# Patient Record
Sex: MALE | Race: WHITE | Employment: FULL TIME | URBAN - METROPOLITAN AREA
[De-identification: names, ages, dates, MRNs, and addresses within clinical notes are randomized per-mention and may not be internally consistent; named-entity substitution may affect disease eponyms.]

---

## 2018-12-24 ENCOUNTER — HOSPITAL ENCOUNTER (OUTPATIENT)
Age: 68
Setting detail: OUTPATIENT SURGERY
Discharge: HOME | End: 2018-12-24
Attending: INTERNAL MEDICINE | Admitting: INTERNAL MEDICINE
Payer: COMMERCIAL

## 2018-12-24 ENCOUNTER — ANESTHESIA (OUTPATIENT)
Dept: ENDOSCOPY | Age: 68
End: 2018-12-24
Payer: COMMERCIAL

## 2018-12-24 ENCOUNTER — ANESTHESIA EVENT (OUTPATIENT)
Dept: ENDOSCOPY | Age: 68
End: 2018-12-24
Payer: COMMERCIAL

## 2018-12-24 VITALS
OXYGEN SATURATION: 98 % | DIASTOLIC BLOOD PRESSURE: 71 MMHG | SYSTOLIC BLOOD PRESSURE: 113 MMHG | WEIGHT: 215 LBS | RESPIRATION RATE: 20 BRPM | HEART RATE: 60 BPM | BODY MASS INDEX: 32.58 KG/M2 | TEMPERATURE: 98 F | HEIGHT: 68 IN

## 2018-12-24 PROCEDURE — 74011250636 HC RX REV CODE- 250/636

## 2018-12-24 PROCEDURE — 77030003657 HC NDL SCLER BSC -B: Performed by: INTERNAL MEDICINE

## 2018-12-24 PROCEDURE — 74011250636 HC RX REV CODE- 250/636: Performed by: INTERNAL MEDICINE

## 2018-12-24 PROCEDURE — 76040000019: Performed by: INTERNAL MEDICINE

## 2018-12-24 PROCEDURE — 77030013992 HC SNR POLYP ENDOSC BSC -B: Performed by: INTERNAL MEDICINE

## 2018-12-24 PROCEDURE — 76060000031 HC ANESTHESIA FIRST 0.5 HR: Performed by: INTERNAL MEDICINE

## 2018-12-24 PROCEDURE — 88305 TISSUE EXAM BY PATHOLOGIST: CPT

## 2018-12-24 RX ORDER — ATROPINE SULFATE 0.1 MG/ML
0.5 INJECTION INTRAVENOUS
Status: DISCONTINUED | OUTPATIENT
Start: 2018-12-24 | End: 2018-12-24 | Stop reason: HOSPADM

## 2018-12-24 RX ORDER — GUAIFENESIN 100 MG/5ML
81 LIQUID (ML) ORAL DAILY
COMMUNITY

## 2018-12-24 RX ORDER — MIDAZOLAM HYDROCHLORIDE 1 MG/ML
.25-5 INJECTION, SOLUTION INTRAMUSCULAR; INTRAVENOUS
Status: DISCONTINUED | OUTPATIENT
Start: 2018-12-24 | End: 2018-12-24 | Stop reason: HOSPADM

## 2018-12-24 RX ORDER — PROPOFOL 10 MG/ML
INJECTION, EMULSION INTRAVENOUS AS NEEDED
Status: DISCONTINUED | OUTPATIENT
Start: 2018-12-24 | End: 2018-12-24 | Stop reason: HOSPADM

## 2018-12-24 RX ORDER — EPINEPHRINE 0.1 MG/ML
1 INJECTION INTRACARDIAC; INTRAVENOUS
Status: DISCONTINUED | OUTPATIENT
Start: 2018-12-24 | End: 2018-12-24 | Stop reason: HOSPADM

## 2018-12-24 RX ORDER — LIDOCAINE HYDROCHLORIDE 20 MG/ML
INJECTION, SOLUTION EPIDURAL; INFILTRATION; INTRACAUDAL; PERINEURAL AS NEEDED
Status: DISCONTINUED | OUTPATIENT
Start: 2018-12-24 | End: 2018-12-24 | Stop reason: HOSPADM

## 2018-12-24 RX ORDER — NALOXONE HYDROCHLORIDE 0.4 MG/ML
0.4 INJECTION, SOLUTION INTRAMUSCULAR; INTRAVENOUS; SUBCUTANEOUS
Status: DISCONTINUED | OUTPATIENT
Start: 2018-12-24 | End: 2018-12-24 | Stop reason: HOSPADM

## 2018-12-24 RX ORDER — FLUMAZENIL 0.1 MG/ML
0.2 INJECTION INTRAVENOUS
Status: DISCONTINUED | OUTPATIENT
Start: 2018-12-24 | End: 2018-12-24 | Stop reason: HOSPADM

## 2018-12-24 RX ORDER — PROPOFOL 10 MG/ML
INJECTION, EMULSION INTRAVENOUS
Status: DISCONTINUED | OUTPATIENT
Start: 2018-12-24 | End: 2018-12-24 | Stop reason: HOSPADM

## 2018-12-24 RX ORDER — SODIUM CHLORIDE 9 MG/ML
50 INJECTION, SOLUTION INTRAVENOUS CONTINUOUS
Status: DISCONTINUED | OUTPATIENT
Start: 2018-12-24 | End: 2018-12-24 | Stop reason: HOSPADM

## 2018-12-24 RX ORDER — DEXTROMETHORPHAN/PSEUDOEPHED 2.5-7.5/.8
1.2 DROPS ORAL
Status: DISCONTINUED | OUTPATIENT
Start: 2018-12-24 | End: 2018-12-24 | Stop reason: HOSPADM

## 2018-12-24 RX ORDER — FENTANYL CITRATE 50 UG/ML
100 INJECTION, SOLUTION INTRAMUSCULAR; INTRAVENOUS
Status: DISCONTINUED | OUTPATIENT
Start: 2018-12-24 | End: 2018-12-24 | Stop reason: HOSPADM

## 2018-12-24 RX ADMIN — LIDOCAINE HYDROCHLORIDE 60 MG: 20 INJECTION, SOLUTION EPIDURAL; INFILTRATION; INTRACAUDAL; PERINEURAL at 08:10

## 2018-12-24 RX ADMIN — PROPOFOL 75 MCG/KG/MIN: 10 INJECTION, EMULSION INTRAVENOUS at 08:10

## 2018-12-24 RX ADMIN — PROPOFOL 80 MG: 10 INJECTION, EMULSION INTRAVENOUS at 08:10

## 2018-12-24 RX ADMIN — SODIUM CHLORIDE 50 ML/HR: 900 INJECTION, SOLUTION INTRAVENOUS at 07:03

## 2018-12-24 NOTE — ANESTHESIA POSTPROCEDURE EVALUATION
Procedure(s):  COLONOSCOPY  ENDOSCOPIC POLYPECTOMY  INJECTION.     Anesthesia Post Evaluation      Multimodal analgesia: multimodal analgesia not used between 6 hours prior to anesthesia start to PACU discharge  Patient location during evaluation: PACU  Patient participation: complete - patient participated  Level of consciousness: awake  Pain management: adequate  Airway patency: patent  Anesthetic complications: no  Cardiovascular status: acceptable, blood pressure returned to baseline and hemodynamically stable  Respiratory status: acceptable  Hydration status: acceptable        Visit Vitals  /71   Pulse 60   Temp 36.7 °C (98 °F)   Resp 20   Ht 5' 8\" (1.727 m)   Wt 97.5 kg (215 lb)   SpO2 98%   BMI 32.69 kg/m²

## 2018-12-24 NOTE — ROUTINE PROCESS
Jackie Johnsons  1950  [id number]    Situation:  Verbal report received from: Marilyn  Procedure: Procedure(s):  COLONOSCOPY  ENDOSCOPIC POLYPECTOMY  INJECTION    Background:    Preoperative diagnosis: FAMILY HISTORY OF MALIGNANT NEOPLASM OF GI TRACT  Postoperative diagnosis: Colon Polyps x 2      :  Dr. Hastings Has  Assistant(s): Endoscopy Technician-1: Adrianna Torres  Endoscopy RN-1: Shanna Vasquez RN    Specimens:   ID Type Source Tests Collected by Time Destination   1 : Colon Polyps x 2 Preservative   Mauro Hollis MD 12/24/2018 5611 Pathology     H. Pylori  no    Assessment:  Intra-procedure medications   Intravenous fluids: NS@ KVO     Vital signs stable     Abdominal assessment: round and soft     Recommendation:  Discharge patient per MD order.   Family or Friend   Permission to share finding with family or friend yes

## 2018-12-24 NOTE — H&P
Gastroenterology Outpatient History and Physical    Patient: Stephen Yañezchilla    Physician: Maria Fernanda Aguilar MD    Vital Signs: See RN notes    Allergies: Allergies not on file    Chief Complaint: colon cancer screen    History of Present Illness: No symptoms; no chest pain, SOB, edema, focal weakness, numbness    Justification for Procedure: age    History:  No past medical history on file. No past surgical history on file. No family history on file. Medications:   Prior to Admission medications    Not on File       Physical Exam:   General: alert, cooperative, no distress   HEENT: Head: Normal, normocephalic, atraumatic. Heart: regular rate and rhythm   Lungs: chest clear, no wheezing, rales, normal symmetric air entry   Abdominal: Bowel sounds are normal, liver is not enlarged, spleen is not enlarged           Findings/Diagnosis: Colon cancer screening    Plan of Care/Planned Procedure: I've discussed colonoscopy possible biopsy, polypectomy, cautery, injection, alternatives, complications including but not limited to pain, cardiopulmonary event, bleeding, perforation requiring additional blood transfusion or operative repair; all questions answered.     Signed By: Maria Fernanda Aguilar MD     December 24, 2018

## 2018-12-24 NOTE — DISCHARGE INSTRUCTIONS
Calvin Jiménez  [id number]  1950    DISCHARGE INSTRUCTIONS  Discomfort:  Redness at IV site- apply warm compress to area; if redness or soreness persist- contact your physician. There may be a slight amount of blood passed from the rectum. Gaseous discomfort - walking, belching will help relieve any discomfort. You may not operate a vehicle for 12 hours. You may not engage in an occupation involving machinery or appliances for rest of today. You may not drink alcoholic beverages for at least 12 hours. Avoid making any critical decisions for at least 24 hours. DIET:   High fiber diet. - however -  remember your colon is empty and a heavy meal will produce gas. Avoid these foods:  vegetables, fried / greasy foods, carbonated drinks for today. ACTIVITY:  You may resume your normal daily activities it is recommended that you spend the remainder of the day resting -  avoid any strenuous activity. CALL M.D.   ANY SIGN OF:   Increasing pain, nausea, vomiting  Abdominal distension (swelling)  New increased bleeding (oral or rectal)  Fever (chills)  Pain in chest area  Bloody discharge from nose or mouth  Shortness of breath     Follow-up Instructions:  Call Dr. Margo Elizabeth in five years follow up exam      DISCHARGE SUMMARY from Nurse    The following personal items collected during your admission are returned to you:   Dental Appliance: Dental Appliances: None  Vision: Visual Aid: Glasses  Hearing Aid:    Jewelry:    Clothing:    Other Valuables:    Valuables sent to safe:

## 2018-12-24 NOTE — PROCEDURES
[address]  [phone]      2018    Colonoscopy Procedure Note  Ankita Louis  :  1950  Marco A Medical Record Number: [id number]    Indications:     Screening colonoscopy  PCP:  Dale Soriano MD  Anesthesia/Sedation: Conscious Sedation/Moderate Sedation  Endoscopist:  Dr. Merline Bald; no surgical assistants  Complications:  None  Estimate Blood Loss:  None    Permit:  The indications, risks, benefits and alternatives were reviewed with the patient or their decision maker who was provided an opportunity to ask questions and all questions were answered. The specific risks of colonoscopy with conscious sedation were reviewed, including but not limited to anesthetic complication, bleeding, adverse drug reaction, missed lesion, infection, IV site reactions, and intestinal perforation which would lead to the need for surgical repair. Alternatives to colonoscopy including radiographic imaging, observation without testing, or laboratory testing were reviewed including the limitations of those alternatives. After considering the options and having all their questions answered, the patient or their decision maker provided both verbal and written consent to proceed. Procedure in Detail:  After obtaining informed consent, positioning of the patient in the left lateral decubitus position, and conduction of a pre-procedure pause or \"time out\" the endoscope was introduced into the anus and advanced to the cecum, which was identified by the ileocecal valve and appendiceal orifice. The quality of the colonic preparation was satisfactory. A careful inspection was made as the colonoscope was withdrawn, findings and interventions are described below.     Appendiceal orifice photographed    Findings:   12 mm sessile ascending polyp  27 mm sessile rectal polyp    Specimens:    ascending polyp removed cold snare; rectal polyp removed cautery snare over saline pillow    Complications:   None; patient tolerated the procedure well. Estimated blood loss: none    Impression:  colon and rectal polyp    Recommendations:     - Repeat colonoscopy in 5 years. Thank you for entrusting me with this patient's care. Please do not hesitate to contact me with any questions or if I can be of assistance with any of your other patients' GI needs.     Signed By: Laura Gallardo MD                        December 24, 2018

## 2018-12-24 NOTE — ANESTHESIA PREPROCEDURE EVALUATION
Anesthetic History   No history of anesthetic complications            Review of Systems / Medical History  Patient summary reviewed and pertinent labs reviewed    Pulmonary  Within defined limits                 Neuro/Psych   Within defined limits           Cardiovascular  Within defined limits                Exercise tolerance: >4 METS     GI/Hepatic/Renal         Renal disease: stones       Endo/Other        Arthritis     Other Findings   Comments: Factor V leiden           Physical Exam    Airway  Mallampati: II  TM Distance: 4 - 6 cm  Neck ROM: normal range of motion   Mouth opening: Normal     Cardiovascular  Regular rate and rhythm,  S1 and S2 normal,  no murmur, click, rub, or gallop  Rhythm: regular  Rate: normal         Dental  No notable dental hx       Pulmonary  Breath sounds clear to auscultation               Abdominal  GI exam deferred       Other Findings            Anesthetic Plan    ASA: 2  Anesthesia type: MAC          Induction: Intravenous  Anesthetic plan and risks discussed with: Patient

## 2018-12-24 NOTE — PERIOP NOTES
Report from Suleman Napoles, [facility], see anesthesia record. ABD remains soft and non-tender post procedure. Pt has no complaints at this time and tolerated the procedure well. Endoscope was pre-cleaned at bedside immediately following procedure by Alessia Glasgow.

## 2019-07-25 ENCOUNTER — HOSPITAL ENCOUNTER (OUTPATIENT)
Dept: CT IMAGING | Age: 69
Discharge: HOME | End: 2019-07-25
Attending: INTERNAL MEDICINE
Payer: COMMERCIAL

## 2019-07-25 DIAGNOSIS — C20 PRIMARY MALIGNANT NEOPLASM OF RECTUM (HCC): ICD-10-CM

## 2019-07-25 LAB — CREAT BLD-MCNC: 0.9 MG/DL (ref 0.6–1.3)

## 2019-07-25 PROCEDURE — 74177 CT ABD & PELVIS W/CONTRAST: CPT

## 2019-07-25 PROCEDURE — 74011636320 HC RX REV CODE- 636/320: Performed by: RADIOLOGY

## 2019-07-25 PROCEDURE — 82565 ASSAY OF CREATININE: CPT

## 2019-07-25 RX ADMIN — IOPAMIDOL 100 ML: 755 INJECTION, SOLUTION INTRAVENOUS at 19:07

## 2020-03-26 ENCOUNTER — HOSPITAL ENCOUNTER (OUTPATIENT)
Age: 70
Setting detail: OBSERVATION
Discharge: HOME | End: 2020-03-27
Attending: EMERGENCY MEDICINE | Admitting: INTERNAL MEDICINE
Payer: COMMERCIAL

## 2020-03-26 ENCOUNTER — APPOINTMENT (OUTPATIENT)
Dept: CT IMAGING | Age: 70
End: 2020-03-26
Attending: EMERGENCY MEDICINE
Payer: COMMERCIAL

## 2020-03-26 DIAGNOSIS — R42 DIZZINESS: ICD-10-CM

## 2020-03-26 DIAGNOSIS — G45.9 TIA (TRANSIENT ISCHEMIC ATTACK): Primary | ICD-10-CM

## 2020-03-26 LAB
BASOPHILS # BLD: 0 K/UL (ref 0–0.1)
BASOPHILS NFR BLD: 1 % (ref 0–1)
DIFFERENTIAL METHOD BLD: ABNORMAL
EOSINOPHIL # BLD: 0.4 K/UL (ref 0–0.4)
EOSINOPHIL NFR BLD: 6 % (ref 0–7)
ERYTHROCYTE [DISTWIDTH] IN BLOOD BY AUTOMATED COUNT: 12.8 % (ref 11.5–14.5)
GLUCOSE BLD STRIP.AUTO-MCNC: 120 MG/DL (ref 65–100)
HCT VFR BLD AUTO: 45.3 % (ref 36.6–50.3)
HGB BLD-MCNC: 15.4 G/DL (ref 12.1–17)
IMM GRANULOCYTES # BLD AUTO: 0 K/UL (ref 0–0.04)
IMM GRANULOCYTES NFR BLD AUTO: 1 % (ref 0–0.5)
INR PPP: 1 (ref 0.9–1.1)
LYMPHOCYTES # BLD: 1.9 K/UL (ref 0.8–3.5)
LYMPHOCYTES NFR BLD: 30 % (ref 12–49)
MCH RBC QN AUTO: 30.7 PG (ref 26–34)
MCHC RBC AUTO-ENTMCNC: 34 G/DL (ref 30–36.5)
MCV RBC AUTO: 90.2 FL (ref 80–99)
MONOCYTES # BLD: 0.7 K/UL (ref 0–1)
MONOCYTES NFR BLD: 11 % (ref 5–13)
NEUTS SEG # BLD: 3.4 K/UL (ref 1.8–8)
NEUTS SEG NFR BLD: 51 % (ref 32–75)
NRBC # BLD: 0 K/UL (ref 0–0.01)
NRBC BLD-RTO: 0 PER 100 WBC
PLATELET # BLD AUTO: 161 K/UL (ref 150–400)
PMV BLD AUTO: 10.7 FL (ref 8.9–12.9)
PROTHROMBIN TIME: 10.4 SEC (ref 9–11.1)
RBC # BLD AUTO: 5.02 M/UL (ref 4.1–5.7)
SERVICE CMNT-IMP: ABNORMAL
WBC # BLD AUTO: 6.4 K/UL (ref 4.1–11.1)

## 2020-03-26 PROCEDURE — 82962 GLUCOSE BLOOD TEST: CPT

## 2020-03-26 PROCEDURE — 70496 CT ANGIOGRAPHY HEAD: CPT

## 2020-03-26 PROCEDURE — 80053 COMPREHEN METABOLIC PANEL: CPT

## 2020-03-26 PROCEDURE — 99285 EMERGENCY DEPT VISIT HI MDM: CPT

## 2020-03-26 PROCEDURE — 36415 COLL VENOUS BLD VENIPUNCTURE: CPT

## 2020-03-26 PROCEDURE — 74011250637 HC RX REV CODE- 250/637: Performed by: EMERGENCY MEDICINE

## 2020-03-26 PROCEDURE — 85025 COMPLETE CBC W/AUTO DIFF WBC: CPT

## 2020-03-26 PROCEDURE — 84484 ASSAY OF TROPONIN QUANT: CPT

## 2020-03-26 PROCEDURE — 70450 CT HEAD/BRAIN W/O DYE: CPT

## 2020-03-26 PROCEDURE — 85610 PROTHROMBIN TIME: CPT

## 2020-03-26 RX ORDER — ASPIRIN 325 MG
325 TABLET ORAL
Status: COMPLETED | OUTPATIENT
Start: 2020-03-26 | End: 2020-03-26

## 2020-03-26 RX ORDER — SODIUM CHLORIDE 9 MG/ML
50 INJECTION, SOLUTION INTRAVENOUS ONCE
Status: DISCONTINUED | OUTPATIENT
Start: 2020-03-26 | End: 2020-03-26

## 2020-03-26 RX ADMIN — ASPIRIN 325 MG: 325 TABLET ORAL at 23:47

## 2020-03-27 ENCOUNTER — APPOINTMENT (OUTPATIENT)
Dept: NON INVASIVE DIAGNOSTICS | Age: 70
End: 2020-03-27
Attending: INTERNAL MEDICINE
Payer: COMMERCIAL

## 2020-03-27 ENCOUNTER — APPOINTMENT (OUTPATIENT)
Dept: MRI IMAGING | Age: 70
End: 2020-03-27
Attending: INTERNAL MEDICINE
Payer: COMMERCIAL

## 2020-03-27 VITALS
WEIGHT: 216 LBS | DIASTOLIC BLOOD PRESSURE: 88 MMHG | OXYGEN SATURATION: 97 % | BODY MASS INDEX: 32.74 KG/M2 | HEIGHT: 68 IN | HEART RATE: 73 BPM | TEMPERATURE: 98.2 F | SYSTOLIC BLOOD PRESSURE: 150 MMHG | RESPIRATION RATE: 16 BRPM

## 2020-03-27 PROBLEM — G45.9 TIA (TRANSIENT ISCHEMIC ATTACK): Status: ACTIVE | Noted: 2020-03-27

## 2020-03-27 LAB
ALBUMIN SERPL-MCNC: 3.6 G/DL (ref 3.5–5)
ALBUMIN/GLOB SERPL: 0.8 {RATIO} (ref 1.1–2.2)
ALP SERPL-CCNC: 77 U/L (ref 45–117)
ALT SERPL-CCNC: 27 U/L (ref 12–78)
ANION GAP SERPL CALC-SCNC: 6 MMOL/L (ref 5–15)
AST SERPL-CCNC: 26 U/L (ref 15–37)
ATRIAL RATE: 84 BPM
AV VELOCITY RATIO: 0.76
BILIRUB SERPL-MCNC: 0.3 MG/DL (ref 0.2–1)
BUN SERPL-MCNC: 12 MG/DL (ref 6–20)
BUN/CREAT SERPL: 12 (ref 12–20)
CALCIUM SERPL-MCNC: 8.6 MG/DL (ref 8.5–10.1)
CALCULATED P AXIS, ECG09: 14 DEGREES
CALCULATED R AXIS, ECG10: -17 DEGREES
CALCULATED T AXIS, ECG11: 26 DEGREES
CHLORIDE SERPL-SCNC: 105 MMOL/L (ref 97–108)
CHOLEST SERPL-MCNC: 188 MG/DL
CO2 SERPL-SCNC: 27 MMOL/L (ref 21–32)
CREAT SERPL-MCNC: 1.02 MG/DL (ref 0.7–1.3)
DIAGNOSIS, 93000: NORMAL
ECHO AO ROOT DIAM: 3.66 CM
ECHO AV AREA PEAK VELOCITY: 2.3 CM2
ECHO AV PEAK GRADIENT: 6 MMHG
ECHO AV PEAK VELOCITY: 122.11 CM/S
ECHO LA MAJOR AXIS: 2.99 CM
ECHO LA TO AORTIC ROOT RATIO: 0.82
ECHO LA VOL 2C: 54.25 ML (ref 18–58)
ECHO LA VOL 4C: 49.69 ML (ref 18–58)
ECHO LA VOL BP: 56.23 ML (ref 18–58)
ECHO LA VOL/BSA BIPLANE: 26.63 ML/M2 (ref 16–28)
ECHO LA VOLUME INDEX A2C: 25.69 ML/M2 (ref 16–28)
ECHO LA VOLUME INDEX A4C: 23.53 ML/M2 (ref 16–28)
ECHO LV INTERNAL DIMENSION DIASTOLIC: 4.37 CM (ref 4.2–5.9)
ECHO LV INTERNAL DIMENSION SYSTOLIC: 2.88 CM
ECHO LV IVSD: 0.9 CM (ref 0.6–1)
ECHO LV MASS 2D: 136.1 G (ref 88–224)
ECHO LV MASS INDEX 2D: 64.4 G/M2 (ref 49–115)
ECHO LV POSTERIOR WALL DIASTOLIC: 0.83 CM (ref 0.6–1)
ECHO LVOT DIAM: 1.96 CM
ECHO LVOT PEAK GRADIENT: 3.4 MMHG
ECHO LVOT PEAK VELOCITY: 92.86 CM/S
ECHO MV A VELOCITY: 87.11 CM/S
ECHO MV E DECELERATION TIME (DT): 225.4 MS
ECHO MV E VELOCITY: 67.6 CM/S
ECHO MV E/A RATIO: 0.78
ECHO MV REGURGITANT PEAK GRADIENT: 40.5 MMHG
ECHO MV REGURGITANT PEAK VELOCITY: 318.12 CM/S
ECHO PULMONARY ARTERY SYSTOLIC PRESSURE (PASP): 27 MMHG
ECHO PV MAX VELOCITY: 63.92 CM/S
ECHO PV PEAK GRADIENT: 1.6 MMHG
ECHO RV INTERNAL DIMENSION: 3.26 CM
ECHO TV REGURGITANT MAX VELOCITY: 244.35 CM/S
ECHO TV REGURGITANT PEAK GRADIENT: 23.9 MMHG
ERYTHROCYTE [DISTWIDTH] IN BLOOD BY AUTOMATED COUNT: 12.9 % (ref 11.5–14.5)
GLOBULIN SER CALC-MCNC: 4.3 G/DL (ref 2–4)
GLUCOSE SERPL-MCNC: 111 MG/DL (ref 65–100)
HCT VFR BLD AUTO: 44.5 % (ref 36.6–50.3)
HDLC SERPL-MCNC: 34 MG/DL
HDLC SERPL: 5.5 {RATIO} (ref 0–5)
HGB BLD-MCNC: 15 G/DL (ref 12.1–17)
LDLC SERPL CALC-MCNC: 129.4 MG/DL (ref 0–100)
LIPID PROFILE,FLP: ABNORMAL
LVFS 2D: 34.17 %
MCH RBC QN AUTO: 30.2 PG (ref 26–34)
MCHC RBC AUTO-ENTMCNC: 33.7 G/DL (ref 30–36.5)
MCV RBC AUTO: 89.5 FL (ref 80–99)
MV DEC SLOPE: 3
NRBC # BLD: 0 K/UL (ref 0–0.01)
NRBC BLD-RTO: 0 PER 100 WBC
P-R INTERVAL, ECG05: 176 MS
PLATELET # BLD AUTO: 162 K/UL (ref 150–400)
PMV BLD AUTO: 10.6 FL (ref 8.9–12.9)
POTASSIUM SERPL-SCNC: 3.6 MMOL/L (ref 3.5–5.1)
PROT SERPL-MCNC: 7.9 G/DL (ref 6.4–8.2)
PV END DIASTOLIC VELOCITY: 1 MMHG
Q-T INTERVAL, ECG07: 370 MS
QRS DURATION, ECG06: 88 MS
QTC CALCULATION (BEZET), ECG08: 437 MS
RBC # BLD AUTO: 4.97 M/UL (ref 4.1–5.7)
SODIUM SERPL-SCNC: 138 MMOL/L (ref 136–145)
TRIGL SERPL-MCNC: 123 MG/DL (ref ?–150)
TROPONIN I SERPL-MCNC: <0.05 NG/ML
VENTRICULAR RATE, ECG03: 84 BPM
VLDLC SERPL CALC-MCNC: 24.6 MG/DL
WBC # BLD AUTO: 7.8 K/UL (ref 4.1–11.1)

## 2020-03-27 PROCEDURE — 99218 HC RM OBSERVATION: CPT

## 2020-03-27 PROCEDURE — 97112 NEUROMUSCULAR REEDUCATION: CPT | Performed by: OCCUPATIONAL THERAPIST

## 2020-03-27 PROCEDURE — 96374 THER/PROPH/DIAG INJ IV PUSH: CPT

## 2020-03-27 PROCEDURE — 74011250636 HC RX REV CODE- 250/636: Performed by: INTERNAL MEDICINE

## 2020-03-27 PROCEDURE — 97116 GAIT TRAINING THERAPY: CPT

## 2020-03-27 PROCEDURE — 93005 ELECTROCARDIOGRAM TRACING: CPT

## 2020-03-27 PROCEDURE — 74011250637 HC RX REV CODE- 250/637: Performed by: EMERGENCY MEDICINE

## 2020-03-27 PROCEDURE — 70496 CT ANGIOGRAPHY HEAD: CPT

## 2020-03-27 PROCEDURE — 65660000000 HC RM CCU STEPDOWN

## 2020-03-27 PROCEDURE — 97165 OT EVAL LOW COMPLEX 30 MIN: CPT | Performed by: OCCUPATIONAL THERAPIST

## 2020-03-27 PROCEDURE — 70551 MRI BRAIN STEM W/O DYE: CPT

## 2020-03-27 PROCEDURE — 74011250636 HC RX REV CODE- 250/636: Performed by: EMERGENCY MEDICINE

## 2020-03-27 PROCEDURE — 74011250637 HC RX REV CODE- 250/637: Performed by: INTERNAL MEDICINE

## 2020-03-27 PROCEDURE — 96372 THER/PROPH/DIAG INJ SC/IM: CPT

## 2020-03-27 PROCEDURE — 77030021566

## 2020-03-27 PROCEDURE — 80061 LIPID PANEL: CPT

## 2020-03-27 PROCEDURE — 97161 PT EVAL LOW COMPLEX 20 MIN: CPT

## 2020-03-27 PROCEDURE — 36415 COLL VENOUS BLD VENIPUNCTURE: CPT

## 2020-03-27 PROCEDURE — 74011636320 HC RX REV CODE- 636/320: Performed by: RADIOLOGY

## 2020-03-27 PROCEDURE — 85027 COMPLETE CBC AUTOMATED: CPT

## 2020-03-27 RX ORDER — GUAIFENESIN 100 MG/5ML
81 LIQUID (ML) ORAL DAILY
Status: DISCONTINUED | OUTPATIENT
Start: 2020-03-27 | End: 2020-03-27 | Stop reason: HOSPADM

## 2020-03-27 RX ORDER — ROSUVASTATIN CALCIUM 20 MG/1
20 TABLET, COATED ORAL
Qty: 30 TAB | Refills: 0 | Status: SHIPPED | OUTPATIENT
Start: 2020-03-27

## 2020-03-27 RX ORDER — ROSUVASTATIN CALCIUM 10 MG/1
20 TABLET, COATED ORAL
Status: DISCONTINUED | OUTPATIENT
Start: 2020-03-27 | End: 2020-03-27 | Stop reason: HOSPADM

## 2020-03-27 RX ORDER — ENOXAPARIN SODIUM 100 MG/ML
40 INJECTION SUBCUTANEOUS EVERY 24 HOURS
Status: DISCONTINUED | OUTPATIENT
Start: 2020-03-27 | End: 2020-03-27 | Stop reason: HOSPADM

## 2020-03-27 RX ORDER — ROSUVASTATIN CALCIUM 10 MG/1
10 TABLET, COATED ORAL DAILY
COMMUNITY
End: 2020-03-27

## 2020-03-27 RX ORDER — CLOPIDOGREL 300 MG/1
300 TABLET, FILM COATED ORAL
Status: COMPLETED | OUTPATIENT
Start: 2020-03-27 | End: 2020-03-27

## 2020-03-27 RX ORDER — ACETAMINOPHEN 325 MG/1
650 TABLET ORAL
Status: DISCONTINUED | OUTPATIENT
Start: 2020-03-27 | End: 2020-03-27 | Stop reason: HOSPADM

## 2020-03-27 RX ORDER — HYDRALAZINE HYDROCHLORIDE 20 MG/ML
10 INJECTION INTRAMUSCULAR; INTRAVENOUS
Status: COMPLETED | OUTPATIENT
Start: 2020-03-27 | End: 2020-03-27

## 2020-03-27 RX ORDER — ACETAMINOPHEN 650 MG/1
650 SUPPOSITORY RECTAL
Status: DISCONTINUED | OUTPATIENT
Start: 2020-03-27 | End: 2020-03-27 | Stop reason: HOSPADM

## 2020-03-27 RX ADMIN — HYDRALAZINE HYDROCHLORIDE 10 MG: 20 INJECTION INTRAMUSCULAR; INTRAVENOUS at 00:48

## 2020-03-27 RX ADMIN — ASPIRIN 81 MG 81 MG: 81 TABLET ORAL at 11:19

## 2020-03-27 RX ADMIN — IOPAMIDOL 100 ML: 755 INJECTION, SOLUTION INTRAVENOUS at 00:01

## 2020-03-27 RX ADMIN — SODIUM CHLORIDE 1000 ML: 900 INJECTION, SOLUTION INTRAVENOUS at 00:48

## 2020-03-27 RX ADMIN — CLOPIDOGREL BISULFATE 300 MG: 300 TABLET, FILM COATED ORAL at 00:44

## 2020-03-27 RX ADMIN — ENOXAPARIN SODIUM 40 MG: 40 INJECTION SUBCUTANEOUS at 03:06

## 2020-03-27 NOTE — PROGRESS NOTES
Reason for Admission:   TIA, difficulty ambulating, difficulty speaking, tingling left side of face. Hx Factor V Leiden, hyperlipidemia, kidney stone. States someone from his workplace tested positive for COVID-19. Patient in Respiratory Plus Isolation. RUR Score:   7%/low                  Plan for utilizing home health:  None, no DME. PCP: First and Last name:  Dr. Chyna Diaz   Name of Practice:    Are you a current patient: Yes/No: yes   Approximate date of last visit:  3 months ago                    Current Advanced Directive/Advance Care Plan:   Not on file, next of kin is wife Rose Brown [phone]. Transition of Care Plan:    Chart reviewed, demographics verified. CM role and follow up discussed. Patient lives with wife. Patient works full time. Patient lives in a two story home with 4 steps to enter home and 14 steps to upper level. Patient has prescription drug coverage, uses [address] [facility]. Patient is independent, drives, and provides self care. PLAN:  1. Monitor patient response to treatment and recommendations. 2. Await further evaluation. 3. Patient is appropriate for outpatient PT follow up for vestibular training placement. 4. Patient transport home per wife at discharge. 4. CM to monitor for discharge needs.     Care Management Interventions  PCP Verified by CM: Yes(Dr. Chyna Diaz)  Palliative Care Criteria Met (RRAT>21 & CHF Dx)?: No  Transition of Care Consult (CM Consult): Discharge Planning  Discharge Durable Medical Equipment: No  Physical Therapy Consult: Yes  Occupational Therapy Consult: Yes  Speech Therapy Consult: Yes  Current Support Network: Lives with Spouse  Confirm Follow Up Transport: Family  The Plan for Transition of Care is Related to the Following Treatment Goals : Discharge  Discharge Location  Discharge Placement: Home with family assistance      Rashid Paige RN, MSN, Care manager

## 2020-03-27 NOTE — H&P
[address] [facility] Adult  Hospitalist Group  History and Physical    Primary Care Provider: David Isaac MD  Date of Service:  3/27/2020    Subjective:     Cory Lopez is a 77 y.o. male with past medical history of hyperlipidemia [not compliant with Crestor] factor V Leiden, kidney stone and arthritis came to the ED as code stroke for evaluation of unsteady gait. Patient reports that he had sudden difficulty of maintaining his balance associated with mild tingling left side of his face since 2200 last night. Patient had been laying  and watching TV after dinner and when he got up he felt like he is drunk and was about loose his balance and fall. He had no similar symptoms in the past. He takes Baby Aspirin every other. He denied headache, blurring of vision, nausea, vomiting, dysarthria, dysphagia. Patient also reports that someone from his workplace tested positive for COVID-19 but the identity of the individual kept confidential.  Patient denied any fever, cough, chest pain or shortness of breath     Review of Systems:  12 point review of systems was done and found to be negative except for mentioned in the HPI       Past Medical History:   Diagnosis Date    Arthritis     Factor V Leiden ([address]     Hyperlipidemia     Kidney stones       Past Surgical History:   Procedure Laterality Date    COLONOSCOPY N/A 12/24/2018    COLONOSCOPY performed by Vivienne Shah MD at [address] [place] [address] Right     inguinal     Prior to Admission medications    Medication Sig Start Date End Date Taking? Authorizing Provider   aspirin 81 mg chewable tablet Take 81 mg by mouth daily. Provider, Historical   OTHER Take  by mouth daily. Cholesterol medicine - pt unsure of name of med    Provider, Historical     No Known Allergies   History reviewed. No pertinent family history.      SOCIAL HISTORY:  Patient resides at home  Patient ambulates without support  Smoking history: no  Alcohol history: no        Objective:       Physical Exam:   Physical Exam  Constitutional:       General: He is not in acute distress. Appearance: Normal appearance. He is not ill-appearing, toxic-appearing or diaphoretic. HENT:      Head: Normocephalic and atraumatic. Nose: No congestion. Mouth/Throat:      Pharynx: No oropharyngeal exudate or posterior oropharyngeal erythema. Eyes:      General:         Right eye: No discharge. Left eye: No discharge. Pupils: Pupils are equal, round, and reactive to light. Neck:      Musculoskeletal: Normal range of motion and neck supple. No neck rigidity or muscular tenderness. Cardiovascular:      Rate and Rhythm: Normal rate and regular rhythm. Heart sounds: No murmur. No gallop. Pulmonary:      Effort: Pulmonary effort is normal. No respiratory distress. Breath sounds: Normal breath sounds. No stridor. No wheezing or rhonchi. Abdominal:      General: Abdomen is flat. There is no distension. Palpations: There is no mass. Tenderness: There is no abdominal tenderness. Hernia: No hernia is present. Musculoskeletal: Normal range of motion. General: No swelling or tenderness. Skin:     General: Skin is warm. Coloration: Skin is not jaundiced or pale. Neurological:      General: No focal deficit present. Mental Status: He is alert and oriented to person, place, and time. Cranial Nerves: No cranial nerve deficit. Sensory: No sensory deficit. Psychiatric:         Mood and Affect: Mood normal.         Behavior: Behavior normal.       Cap refill: Brisk, less than 3 seconds  Pulses: 2+, symmetric in all extremities    ECG:  NSR    Data Review: All diagnostic labs and studies have been reviewed. CTA head and neck :- normal no hemodynamically significant stenosis. Assessment:   Dizziness  -Associated with left-sided facial numbness suspicious for TIA/CVA  -Presented as code stroke.   CTA head and neck unremarkable  -Neurochecks every 4 hours, MRI brain, 2D echo  -Passed bedside swallow evaluation, ASA, statins  -PT/OT, telemetry monitoring  -Neurology consult in place, follow recommendations    Hyperlipidemia  -Continue with Crestor for now  -Consider switching to Lipitor upon discharge    Elevated blood pressure  -No history of hypertension  -Allow permissive hypertension  -IV hydralazine PRN    Factor V Leiden  -Not on anticoagulation    Patient reports he was informed yesterday that someone from his workplace tested positive for COVID-19. According to him, no further information was provided regards to close contacts. Patient currently asymptomatic. Will maintain droplet/contact precaution.    Monitor Vital signs closely               FUNCTIONAL STATUS PRIOR TO HOSPITALIZATION (including history of recent falls):full    Signed By: Job Iglesias MD     March 27, 2020

## 2020-03-27 NOTE — PROGRESS NOTES
Problem: Mobility Impaired (Adult and Pediatric)  Goal: *Acute Goals and Plan of Care (Insert Text)  Description: FUNCTIONAL STATUS PRIOR TO ADMISSION: Patient was independent and active without use of DME.    HOME SUPPORT PRIOR TO ADMISSION: The patient lived with wife but did not require assist.    Physical Therapy Goals  Initiated 3/27/2020  1. Patient will move from supine to sit and sit to supine  in bed with independence within 7 day(s). 2.  Patient will transfer from bed to chair and chair to bed with independence using the least restrictive device within 7 day(s). 3.  Patient will perform sit to stand with independence within 7 day(s). 4.  Patient will ambulate with independence for 300 feet with the least restrictive device within 7 day(s). 5.  Patient will ascend/descend 12 stairs with 1 handrail(s) with independence within 7 day(s). Outcome: Progressing Towards Goal   PHYSICAL THERAPY EVALUATION  Patient: Leoncio Late (35 y.o. male)  Date: 3/27/2020  Primary Diagnosis: TIA (transient ischemic attack) [G45.9]        Precautions:   Contact, Fall(Droplet)      ASSESSMENT  Based on the objective data described below, the patient presents with unsteady gait and feeling off balance from home. TIA/CVA rule out with MRI and CT/CTA negative for acute changes. Pt reports he feels like he is on a boat. Ambulated into the bathroom with CGA and support from bed and wall for stability. Episode of emesis while in the bathroom and RN aware. Pt displays full strength and coordination. Reports L sided face with decreased sensation otherwise normal. Visual tracking performed without dizziness though had a difficult time following directions completely. Ambulated in hallway with CGA and displays increased trunk sway along with path deviations requiring use of wall rail for steadying. Given subjective symptoms, functional mobility and negative imaging feel may be inner ear related. Will continue to follow. Current Level of Function Impacting Discharge (mobility/balance): CGA-d/t balance deficits    Functional Outcome Measure: The patient scored 23/28 on the Tinetti outcome measure which is indicative of moderate fall risk. Other factors to consider for discharge: none     Patient will benefit from skilled therapy intervention to address the above noted impairments. PLAN :  Recommendations and Planned Interventions: bed mobility training, transfer training, gait training, therapeutic exercises, neuromuscular re-education, patient and family training/education, and therapeutic activities      Frequency/Duration: Patient will be followed by physical therapy:  5 times a week to address goals. Recommendation for discharge: (in order for the patient to meet his/her long term goals)  Outpatient physical therapy follow up recommended for balance and vestibular training     This discharge recommendation:  Has been made in collaboration with the attending provider and/or case management    IF patient discharges home will need the following DME: patient owns DME required for discharge         SUBJECTIVE:   Patient stated I feel like I have been on a boat.     OBJECTIVE DATA SUMMARY:   HISTORY:    Past Medical History:   Diagnosis Date    Arthritis     Factor V Leiden ([address]     Hyperlipidemia     Kidney stones      Past Surgical History:   Procedure Laterality Date    COLONOSCOPY N/A 12/24/2018    COLONOSCOPY performed by Liat Gibbs MD at [facility]    HX HERNIA REPAIR Right     inguinal       Personal factors and/or comorbidities impacting plan of care: arthritis    Home Situation  Home Environment: Private residence  # Steps to Enter: 4  Rails to Enter: No  One/Two Story Residence: Two story  # of Interior Steps: 12  Interior Rails: Left  Living Alone: No  Support Systems: Spouse/Significant Other/Partner, Child(khurram), Friends \ neighbors, Family member(s)  Patient Expects to be Discharged [facility] [employer] residence  Current DME Used/Available at Home: None  Tub or Shower Type: Tub/Shower combination    EXAMINATION/PRESENTATION/DECISION MAKING:   Critical Behavior:  Neurologic State: Alert  Orientation Level: Oriented X4  Cognition: Appropriate decision making, Appropriate for age attention/concentration, Appropriate safety awareness, Follows commands  Safety/Judgement: Awareness of environment, Driving appropriateness, Fall prevention, Good awareness of safety precautions, Home safety, Insight into deficits  Hearing: Auditory  Auditory Impairment: Hard of hearing, bilateral  Hearing Aids/Status: Bilateral, With patient  Skin:    Edema:   Range Of Motion:  AROM: Within functional limits           PROM: Within functional limits           Strength:    Strength: Within functional limits                    Tone & Sensation:   Tone: Normal              Sensation: Intact               Coordination:  Coordination: Within functional limits  Vision:   Acuity: Within Defined Limits  Corrective Lenses: Glasses  Functional Mobility:  Bed Mobility:  Rolling: Independent  Supine to Sit: Independent  Sit to Supine: Independent  Scooting: Independent  Transfers:  Sit to Stand: Contact guard assistance  Stand to Sit: Contact guard assistance        Bed to Chair: Contact guard assistance              Balance:   Sitting: Intact  Standing: Impaired  Standing - Static: Good  Standing - Dynamic : Fair  Ambulation/Gait Training:  Distance (ft): 250 Feet (ft)  Assistive Device: Gait belt(occasional use of wall railing)  Ambulation - Level of Assistance: Contact guard assistance        Gait Abnormalities: Trunk sway increased; Path deviations        Base of Support: Widened                              Stairs:               Therapeutic Exercises:       Functional Measure:  Tinetti test:    Sitting Balance: 1  Arises: 1  Attempts to Rise: 1  Immediate Standing Balance: 1  Standing Balance: 2  Nudged: 2  Eyes Closed: 1  Turn 360 Degrees - Continuous/Discontinuous: 1  Turn 360 Degrees - Steady/Unsteady: 1  Sitting Down: 1  Balance Score: 12 Balance total score  Indication of Gait: 1  R Step Length/Height: 1  L Step Length/Height: 1  R Foot Clearance: 1  L Foot Clearance: 1  Step Symmetry: 1  Step Continuity: 1  Path: 1  Trunk: 2  Walking Time: 1  Gait Score: 11 Gait total score  Total Score: 23/28 Overall total score         Tinetti Tool Score Risk of Falls  <19 = High Fall Risk  19-24 = Moderate Fall Risk  25-28 = Low Fall Risk  Tinetti ME. Performance-Oriented Assessment of Mobility Problems in Elderly Patients. [facility] 66; [id number]. (Scoring Description: PT Bulletin Feb. 10, 1993)    Older adults: Sayra Sexton et al, 2009; n = 1000 [facility] elderly evaluated with ABC, JENNIFER, ADL, and IADL)  · Mean JENNIFER score for males aged 69-68 years = 26.21(3.40)  · Mean JENNIFER score for females age 69-68 years = 25.16(4.30)  · Mean JENNIFER score for males over 80 years = 23.29(6.02)  · Mean JENNIFER score for females over 80 years = 17.20(8.32)            Physical Therapy Evaluation Charge Determination   History Examination Presentation Decision-Making   MEDIUM  Complexity : 1-2 comorbidities / personal factors will impact the outcome/ POC  MEDIUM Complexity : 3 Standardized tests and measures addressing body structure, function, activity limitation and / or participation in recreation  LOW Complexity : Stable, uncomplicated  Other outcome measures Tinetti  LOW       Based on the above components, the patient evaluation is determined to be of the following complexity level: LOW       Activity Tolerance:   Good  Please refer to the flowsheet for vital signs taken during this treatment. After treatment patient left in no apparent distress:   Supine in bed and Call bell within reach    COMMUNICATION/EDUCATION:   The patients plan of care was discussed with: Registered nurse.      Fall prevention education was provided and the patient/caregiver indicated understanding., Patient/family have participated as able in goal setting and plan of care. , and Patient/family agree to work toward stated goals and plan of care.     Thank you for this referral.  Pily Bejarano, PT   Time Calculation: 30 mins

## 2020-03-27 NOTE — PROGRESS NOTES
Problem: Patient Education: Go to Patient Education Activity  Goal: Patient/Family Education  Outcome: Progressing Towards Goal     Problem: TIA/CVA Stroke: 0-24 hours  Goal: Activity/Safety  Outcome: Progressing Towards Goal  Goal: Consults, if ordered  Outcome: Progressing Towards Goal  Goal: Diagnostic Test/Procedures  Outcome: Progressing Towards Goal  Goal: Nutrition/Diet  Outcome: Progressing Towards Goal  Goal: Discharge Planning  Outcome: Progressing Towards Goal  Goal: Medications  Outcome: Progressing Towards Goal  Goal: Respiratory  Outcome: Progressing Towards Goal  Goal: Treatments/Interventions/Procedures  Outcome: Progressing Towards Goal  Goal: Minimize risk of bleeding post-thrombolytic infusion  Outcome: Progressing Towards Goal  Goal: Monitor for complications post-thrombolytic infusion  Outcome: Progressing Towards Goal  Goal: Psychosocial  Outcome: Progressing Towards Goal  Goal: *Hemodynamically stable  Outcome: Progressing Towards Goal  Goal: *Neurologically stable  Description: Absence of additional neurological deficits    Outcome: Progressing Towards Goal  Goal: *Verbalizes anxiety and depression are reduced or absent  Outcome: Progressing Towards Goal  Goal: *Absence of Signs of Aspiration on Current Diet  Outcome: Progressing Towards Goal  Goal: *Absence of deep venous thrombosis signs and symptoms(Stroke Metric)  Outcome: Progressing Towards Goal  Goal: *Ability to perform ADLs and demonstrates progressive mobility and function  Outcome: Progressing Towards Goal  Goal: *Stroke education started(Stroke Metric)  Outcome: Progressing Towards Goal  Goal: *Dysphagia screen performed(Stroke Metric)  Outcome: Progressing Towards Goal  Goal: *Rehab consulted(Stroke Metric)  Outcome: Progressing Towards Goal     Problem: Risk for Spread of Infection  Goal: Prevent transmission of infectious organism to others  Description: Prevent the transmission of infectious organisms to other patients, staff members, and visitors. Outcome: Progressing Towards Goal     Problem: Patient Education:  Go to Education Activity  Goal: Patient/Family Education  Outcome: Progressing Towards Goal    0730: Bedside and Verbal shift change report given to Alona BETANCOURT (oncoming nurse) by Branden Camacho (offgoing nurse). Report included the following information SBAR, Kardex, Intake/Output and Recent Results.

## 2020-03-27 NOTE — CONSULTS
NEUROLOGY IN-PATIENT NEW CONSULTATION      3/27/2020    RE: Jovani Meyer         1950      REFERRED BY:  Ha Boyce MD      CHIEF COMPLAINT:  This is Jovani Meyer is a 77 y.o. male right handed who had concerns including Extremity Weakness. HPI:     Patient was watching TV and when he stood up, has been feeling off balanced described as being unsteady. (-) spinning around  (-) headache  (+) paresthesia L temple area  (-) weakness  Admits to not taking Crestor consistently  Only takes  every other day. Feels better today, but still feels unsteady. ROS   (-) fever  (-) rash  All other systems reviewed and are negative    Past Medical Hx  Past Medical History:   Diagnosis Date    Arthritis     Factor V Leiden ([facility] [address]     Hyperlipidemia     Kidney stones        Social Hx  Social History     Socioeconomic History    Marital status:      Spouse name: Not on file    Number of children: Not on file    Years of education: Not on file    Highest education level: Not on file   Tobacco Use    Smoking status: Never Smoker    Smokeless tobacco: Never Used   Substance and Sexual Activity    Alcohol use: No     Frequency: Never    Drug use: No       Family Hx  History reviewed. No pertinent family history.     ALLERGIES  No Known Allergies    CURRENT MEDS  Current Facility-Administered Medications   Medication Dose Route Frequency Provider Last Rate Last Dose    aspirin chewable tablet 81 mg  81 mg Oral DAILY Shelbi Bateman MD        acetaminophen (TYLENOL) tablet 650 mg  650 mg Oral Q4H PRN Shelbi Bateman MD        Or    acetaminophen (TYLENOL) solution 650 mg  650 mg Per NG tube Q4H PRN Shelbi Bateman MD        Or    acetaminophen (TYLENOL) suppository 650 mg  650 mg Rectal Q4H PRN Shelbi Btaeman MD        enoxaparin (LOVENOX) injection 40 mg  40 mg SubCUTAneous Q24H Shelbi Bateman MD   40 mg at 03/27/20 0306           PREVIOUS WORKUP: (reviewed)  IMAGING:    CT Results (recent):  Results from Hospital Encounter encounter on 03/26/20   CTA CODE NEURO HEAD AND NECK W CONT    Narrative EXAM: CTA CODE NEURO HEAD AND NECK W CONT    INDICATION: Code Stroke    COMPARISON: None. CONTRAST: 100 mL of 370. TECHNIQUE:  Unenhanced  images were obtained to localize the volume for  acquisition. Multislice helical axial CT angiography was performed from the  aortic arch to the top of the head during uneventful rapid bolus intravenous  contrast administration. Coronal and sagittal reformations and 3D post  processing was performed. CT dose reduction was achieved through use of a  standardized protocol tailored for this examination and automatic exposure  control for dose modulation. FINDINGS:    CTA NECK  No large pulmonary mass or nodule. Thoracic aorta demonstrates atherosclerotic  change. 3 vessel arch. Moderate to severe stenosis of the proximal left  vertebral artery. The right vertebral artery is dominant. . Multilevel foraminal  stenoses of the cervical spine. Mild atherosclerotic change of the carotid  vessels. Minimal ethmoid sinus disease. . Atherosclerotic change at the origins  of the ICAs on the right and on the left.    % of right carotid artery stenosis: 0  % of left carotid artery stenosis: 0    NASCET method was utilized for calculating stenosis. The cervical soft tissues are unremarkable. There are degenerative changes of  the cervical spine. CTA HEAD  Petrous and cavernous ICAs are patent. The right vertebral artery is dominant  left vertebral artery terminates in PICA. Mild mid basilar stenosis. Diminutive  basilar artery. Fetal circulation to the P1 and P2 segments on the right and on  the left. . . The internal carotid, anterior cerebral, and middle cerebral  arteries are patent. There is no flow-limiting intracranial stenosis. There is  no aneurysm. Empty sella. Maxillary sinus disease.       Impression IMPRESSION: No acute intracranial process. No aneurysm, dissection or hemodynamically significant stenosis. No major vessel occlusion. Emerson Brenda MRI Results (recent):  No results found for this or any previous visit. IR Results (recent):  No results found for this or any previous visit. VAS/US Results (recent):  No results found for this or any previous visit. LABS (reviewed)  Results for orders placed or performed during the hospital encounter of 03/26/20   CBC WITH AUTOMATED DIFF   Result Value Ref Range    WBC 6.4 4.1 - 11.1 K/uL    RBC 5.02 4.10 - 5.70 M/uL    HGB 15.4 12.1 - 17.0 g/dL    HCT 45.3 36.6 - 50.3 %    MCV 90.2 80.0 - 99.0 FL    MCH 30.7 26.0 - 34.0 PG    MCHC 34.0 30.0 - 36.5 g/dL    RDW 12.8 11.5 - 14.5 %    PLATELET 383 438 - 693 K/uL    MPV 10.7 8.9 - 12.9 FL    NRBC 0.0 0  WBC    ABSOLUTE NRBC 0.00 0.00 - 0.01 K/uL    NEUTROPHILS 51 32 - 75 %    LYMPHOCYTES 30 12 - 49 %    MONOCYTES 11 5 - 13 %    EOSINOPHILS 6 0 - 7 %    BASOPHILS 1 0 - 1 %    IMMATURE GRANULOCYTES 1 (H) 0.0 - 0.5 %    ABS. NEUTROPHILS 3.4 1.8 - 8.0 K/UL    ABS. LYMPHOCYTES 1.9 0.8 - 3.5 K/UL    ABS. MONOCYTES 0.7 0.0 - 1.0 K/UL    ABS. EOSINOPHILS 0.4 0.0 - 0.4 K/UL    ABS. BASOPHILS 0.0 0.0 - 0.1 K/UL    ABS. IMM. GRANS. 0.0 0.00 - 0.04 K/UL    DF AUTOMATED     METABOLIC PANEL, COMPREHENSIVE   Result Value Ref Range    Sodium 138 136 - 145 mmol/L    Potassium 3.6 3.5 - 5.1 mmol/L    Chloride 105 97 - 108 mmol/L    CO2 27 21 - 32 mmol/L    Anion gap 6 5 - 15 mmol/L    Glucose 111 (H) 65 - 100 mg/dL    BUN 12 6 - 20 MG/DL    Creatinine 1.02 0.70 - 1.30 MG/DL    BUN/Creatinine ratio 12 12 - 20      GFR est AA >60 >60 ml/min/1.73m2    GFR est non-AA >60 >60 ml/min/1.73m2    Calcium 8.6 8.5 - 10.1 MG/DL    Bilirubin, total 0.3 0.2 - 1.0 MG/DL    ALT (SGPT) 27 12 - 78 U/L    AST (SGOT) 26 15 - 37 U/L    Alk.  phosphatase 77 45 - 117 U/L    Protein, total 7.9 6.4 - 8.2 g/dL    Albumin 3.6 3.5 - 5.0 g/dL    Globulin 4.3 (H) 2.0 - 4.0 g/dL    A-G Ratio 0.8 (L) 1.1 - 2.2     PROTHROMBIN TIME + INR   Result Value Ref Range    INR 1.0 0.9 - 1.1      Prothrombin time 10.4 9.0 - 11.1 sec   TROPONIN I   Result Value Ref Range    Troponin-I, Qt. <0.05 <0.05 ng/mL   LIPID PANEL   Result Value Ref Range    LIPID PROFILE          Cholesterol, total 188 <200 MG/DL    Triglyceride 123 <150 MG/DL    HDL Cholesterol 34 MG/DL    LDL, calculated 129.4 (H) 0 - 100 MG/DL    VLDL, calculated 24.6 MG/DL    CHOL/HDL Ratio 5.5 (H) 0.0 - 5.0     CBC W/O DIFF   Result Value Ref Range    WBC 7.8 4.1 - 11.1 K/uL    RBC 4.97 4.10 - 5.70 M/uL    HGB 15.0 12.1 - 17.0 g/dL    HCT 44.5 36.6 - 50.3 %    MCV 89.5 80.0 - 99.0 FL    MCH 30.2 26.0 - 34.0 PG    MCHC 33.7 30.0 - 36.5 g/dL    RDW 12.9 11.5 - 14.5 %    PLATELET 538 256 - 236 K/uL    MPV 10.6 8.9 - 12.9 FL    NRBC 0.0 0  WBC    ABSOLUTE NRBC 0.00 0.00 - 0.01 K/uL   GLUCOSE, POC   Result Value Ref Range    Glucose (POC) 120 (H) 65 - 100 mg/dL    Performed by [facility]    EKG, 12 LEAD, INITIAL   Result Value Ref Range    Ventricular Rate 84 BPM    Atrial Rate 84 BPM    P-R Interval 176 ms    QRS Duration 88 ms    Q-T Interval 370 ms    QTC Calculation (Bezet) 437 ms    Calculated P Axis 14 degrees    Calculated R Axis -17 degrees    Calculated T Axis 26 degrees    Diagnosis       Normal sinus rhythm  Moderate voltage criteria for LVH, may be normal variant  Borderline ECG  No previous ECGs available  Confirmed by [id number], Brice Whittington MD ([id number]) on 3/27/2020 9:34:14 AM     ECHO ADULT COMPLETE   Result Value Ref Range    Aortic Valve Systolic Peak Velocity 865.50 cm/s    Aortic Valve Area by Continuity of Peak Velocity 2.3 cm2    AoV PG 6.0 mmHg    LVIDd 4.37 4.2 - 5.9 cm    LVPWd 0.83 0.6 - 1.0 cm    LVIDs 2.88 cm    IVSd 0.90 0.6 - 1.0 cm    LVOT d 1.96 cm    LVOT Peak Velocity 92.86 cm/s    LVOT Peak Gradient 3.4 mmHg    MV A Daniele 87.11 cm/s    MV E Daniele 67.60 cm/s    MV E/A 0.80     Left Atrium to Aortic Root Ratio 0.82     RVIDd 3.26 cm    LA Vol 4C 49.69 18 - 58 mL    LA Vol 2C 54.25 18 - 58 mL    LV Mass .1 88 - 224 g    LV Mass AL Index 64.4 49 - 115 g/m2    Mitral Regurgitant Peak Velocity 318.12 cm/s    Mitral Valve E Wave Deceleration Time 225.4 ms    Left Atrium Major Axis 2.99 cm    Triscuspid Valve Regurgitation Peak Gradient 23.9 mmHg    Pulmonic Valve Max Velocity 63.92 cm/s    TR Max Velocity 244.35 cm/s    LA Vol Index 25.69 16 - 28 ml/m2    LA Vol Index 23.53 16 - 28 ml/m2    MR Peak Gradient 40.5 mmHg    Left Ventricular Fractional Shortening by 2D [id number] %    PV End Diastolic Velocity 1.0 mmHg    Mitral Valve Deceleration [place] [id number]     AV Velocity Ratio 0.76     PV peak gradient 1.6 mmHg    LA Volume 56.23 18 - 58 mL    Ao Root D 3.66 cm       Physical Exam:     Visit Vitals  /76   Pulse 70   Temp 97.6 °F (36.4 °C)   Resp 16   Ht 5' 8\" (1.727 m)   Wt 98 kg (216 lb)   SpO2 96%   BMI 32.84 kg/m²     General:  Alert, cooperative, no distress. Head:  Normocephalic, without obvious abnormality, atraumatic. Eyes:  Conjunctivae/corneas clear. Lungs:  Heart:   Non labored breathing  Regular rate and rhythm, no carotid bruits   Abdomen:   Soft, non-distended   Extremities: Extremities normal, atraumatic, no cyanosis or edema. Pulses: 2+ and symmetric all extremities. Skin: Skin color, texture, turgor normal. No rashes or lesions.   Neurologic Exam     Gen: Attention normal             Language: naming, repetition, fluency normal             Memory: intact recent and remote memory  Cranial Nerves:  I: smell Not tested   II: visual fields Full to confrontation   II: pupils Equal, round, reactive to light   II: optic disc No papilledema   III,VII: ptosis none   III,IV,VI: extraocular muscles  Full ROM   V: mastication normal   V: facial light touch sensation  normal   VII: facial muscle function   symmetric   VIII: hearing symmetric   IX: soft palate elevation  normal XI: trapezius strength  5/5   XI: sternocleidomastoid strength 5/5   XI: neck flexion strength  5/5   XII: tongue  midline     Motor: normal bulk and tone, no tremor              Strength: 5/5 all four extremities  Sensory: intact to LT, PP, vibration, and temperature  Coordination: Good FTN and HTS  Gait: deferred           Impression:     Ivette Carvajal is a 77 y.o. male who  has a past medical history of Arthritis, Factor V Leiden ([facility] [address] Hyperlipidemia, and Kidney stones. He also has no past medical history of Diabetes ([facility] [address] or Sleep apnea. who was watching TV and when he noted sudden feeling off balanced described as being unsteady. (-) spinning around. (+) paresthesia L temple area. Admits to not taking Crestor consistently. Only takes  every other day. CTA head and neck was unremarkable. Considerations include inner ear pathology and stroke. RECOMMENDATIONS  1. I had a long discussion with patient. Discussed diagnosis, prognosis, pathophysiology and available treatment. Reviewed test results. All questions were answered. 2. Awaiting MRI brain to evaluate for stroke. If negative despite patient still having symptoms, consider inner ear issue and suggest referring to ENT specialist as out patient  3. .4. Advise to take Crestor 20 mg every day consistently  4. Optimize medical management of HTN  5. Follow up Echo    Please call for questions    Dr Rossy Richard is taking over neurology service this afternoon.     Thank you for the consultation      Sammie Becerra MD  Diplomate, American Board of Psychiatry and Neurology  Diplomate, Neuromuscular Medicine  Diplomate, American Board of Electrodiagnostic Medicine    Greater than 50% of time spent counseling patient        CC: Eugenio Shaw MD  Fax: [phone]

## 2020-03-27 NOTE — PROGRESS NOTES
Admission Medication Reconciliation:     Information obtained from:    Patient  The office of Dr. Gume Olson:  No    Comments/Recommendations:   Patient admits to noncompliance but does not list a specific barrier to compliance such as cost or side effects. He reports he is just out of the habit of taking medications. Updated PTA medication list  Verified preferred pharmacy  Reviewed patient's allergies     ¹[employer] pharmacy benefit data reflects medications filled and processed through the patient's insurance, however   this data does NOT capture whether the medication was picked up or is currently being taken by the patient. Prior to Admission Medications   Prescriptions Last Dose Informant Taking?   aspirin 81 mg chewable tablet 3/20/2020 at Unknown time  Yes   Sig: Take 81 mg by mouth daily. rosuvastatin (Crestor) 10 mg tablet 3/20/2020 at Unknown time  Yes   Sig: Take 10 mg by mouth daily. Facility-Administered Medications: None         Please contact the main inpatient pharmacy with any questions or concerns at [phone] and we will direct you to the clinical pharmacist covering this patient's care while in-house.    Ronda Willson, JaysonD, BCPS

## 2020-03-27 NOTE — PROGRESS NOTES
Patient with no documented speech or swallowing issues. He passed the STAND and is tolerating regular diet. Head CT:negative. Currently no SLP needs; evaluated deferrred at this time.

## 2020-03-27 NOTE — DISCHARGE SUMMARY
Physician Discharge Summary     Patient ID:  Michael Blackmon  [id number]  77 y.o.  1950    Admit date: 3/26/2020    Discharge date: 3/27/2020    Admission Diagnoses: TIA (transient ischemic attack) [G45.9]    Discharge Diagnoses:  Principal Diagnosis <principal problem not specified>                                            Active Problems:    TIA (transient ischemic attack) (3/27/2020)         Resolved Problems:  Problem List as of 3/27/2020 Never Reviewed          Codes Class Noted - Resolved    TIA (transient ischemic attack) ICD-10-CM: G45.9  ICD-9-CM: 435.9  3/27/2020 - Present                Hospital Course:   Mr. Hardie Meigs was admitted to the Hospitalist Service on the 3rd floor for treatment of TIA. He was evaluated by Neurology. TIA work up including MRI and echocardiogram were unremarkable. His LDL was slightly elevated and Neurology recommended increasing his statin. They had no further recommendations. He was cleared by PT and Speech. He was discharged home on 3/27/2020 in improved condition. PCP: Bora Franz MD    Consults: Neurology    Discharge Exam:  Unchanged from admission note today. Disposition: home    Patient Instructions:   Current Discharge Medication List      CONTINUE these medications which have CHANGED    Details   rosuvastatin (CRESTOR) 20 mg tablet Take 1 Tab by mouth nightly. Qty: 30 Tab, Refills: 0         CONTINUE these medications which have NOT CHANGED    Details   aspirin 81 mg chewable tablet Take 81 mg by mouth daily.             Activity: Activity as tolerated  Diet: Cardiac Diet  Wound Care: None needed    Follow-up Information     Follow up With Specialties Details Why Contact Timmy Moser NP Nurse Practitioner In 4 weeks please make your appointment with Dr. Arsh Loaiza [address] [facility]  [address] [place]  [phone]      Bora Franz MD Family Practice In 2 weeks TIA follow-up, ask for referral to outpatient balance/vestibular therapy [address] [place] [address]  [phone]            20 minutes were spent on this discharge.     Signed:  Estanislado Sandhoff, MD  3/27/2020  2:18 PM

## 2020-03-27 NOTE — PROGRESS NOTES
[address] [address]  [phone]    Daily Progress Note      Admission plan reviewed with Dr. Emily Buchanan.  Patient seen and examined. Ambreen Sams feels a little better today.   The following changes to [facility] plan of care were made: none    Face to face time spent: 5 minutes      Nini Beach MD  3/27/2020  8:51 AM

## 2020-03-27 NOTE — ED TRIAGE NOTES
Triage: [facility] [id number] informed his wife that he was having tingling sensation on the left side and having trouble walking wife noticed that his balance was off.

## 2020-03-27 NOTE — PROGRESS NOTES
Problem: Self Care Deficits Care Plan (Adult)  Goal: *Acute Goals and Plan of Care (Insert Text)  Description:   FUNCTIONAL STATUS PRIOR TO ADMISSION: Patient was independent and active without use of DME. He drives and works in [email or web address]. Pt stated a co-worker tested positive for Covid-19 recently. HOME SUPPORT: The patient lived with wife but did not require assist.    Occupational Therapy Goals  Initiated 3/27/2020  1. Patient will perform grooming and bathing standing at sink with modified independence within 7 day(s). 2.  Patient will perform lower body dressing including gathering clothing with modified independence within 7 day(s). 3.  Patient will perform simple home management with modified independence within 7 day(s). 4.  Patient will perform toilet transfers with modified independence within 7 day(s). 5.  Patient will perform all aspects of toileting with modified independence within 7 day(s). 6.  Patient will participate in upper extremity therapeutic exercise/activities with independence for 10 minutes within 7 day(s). 7.  Patient will utilize energy conservation techniques during functional activities with verbal and visual cues within 7 day(s). Outcome: Progressing Towards Goal     OCCUPATIONAL THERAPY EVALUATION  Patient: Bassam Spera (20 y.o. male)  Date: 3/27/2020  Primary Diagnosis: TIA (transient ischemic attack) [G45.9]        Precautions:  Contact, Fall(Droplet)    ASSESSMENT  Based on the objective data described below, the patient presents with flat affect, impaired balance in standing and with amb with lean and LOB towards the left, decreased safety awareness and intact UE strength, sensation, and coordination BUEs following admission for L facial numbness and impaired gait. CT negative and pt awaiting MRI. Educated pt on signs and symptoms of stroke using BE FAST and he verbalized good understanding.   Educated pt on fall prevention and safety and to use call bell to call for help when needing to get OOB or chair. He verbalized good understanding. Pt was independent, active and working in  prior to admission. Pt is not at his baseline at this time. At end of session pt c/o nausea and RN notified. Current Level of Function Impacting Discharge (ADLs/self-care): CGA and set-up for LE ADLs, toileting and functional mobility without AD. Pt able to self correct LOB and is reaching for furniture and wall to steady self    Functional Outcome Measure: The patient scored Total A-D  Total A-D (Motor Function): 66/66 on the Fugl-Harmon Assessment which is indicative of no impairment in upper extremity functional status. Other factors to consider for discharge: pt with decreased safety awareness     Patient will benefit from skilled therapy intervention to address the above noted impairments. PLAN :  Recommendations and Planned Interventions: self care training, functional mobility training, therapeutic exercise, balance training, therapeutic activities, endurance activities, neuromuscular re-education, patient education, home safety training, and family training/education    Frequency/Duration: Patient will be followed by occupational therapy 5 times a week to address goals. Recommendation for discharge: (in order for the patient to meet his/her long term goals)  To be determined: depending on progress. Do not anticipate any OT needs. This discharge recommendation:  Has not yet been discussed the attending provider and/or case management    IF patient discharges home will need the following DME: TBD       SUBJECTIVE:   Patient stated I'm ok.     OBJECTIVE DATA SUMMARY:   HISTORY:   Past Medical History:   Diagnosis Date    Arthritis     Factor V Leiden ([address]     Hyperlipidemia     Kidney stones      Past Surgical History:   Procedure Laterality Date    COLONOSCOPY N/A 12/24/2018    COLONOSCOPY performed by Vivienne Shah MD at [facility] ENDOSCOPY    HX HERNIA REPAIR Right     inguinal       Expanded or extensive additional review of patient history:     Home Situation  Home Environment: Private residence  # Steps to Enter: 4  Rails to Enter: No  One/Two Story Residence: Two story  # of Interior Steps: 12  Interior Rails: Left  Living Alone: No  Support Systems: Spouse/Significant Other/Partner, Child(khurram), Friends \ neighbors, Family member(s)  Patient Expects to be Discharged to[de-identified] Private residence  Current DME Used/Available at Home: None  Tub or Shower Type: Tub/Shower combination    Hand dominance: Right    EXAMINATION OF PERFORMANCE DEFICITS:  Cognitive/Behavioral Status:  Neurologic State: Alert; Appropriate for age  Orientation Level: Oriented X4  Cognition: Appropriate decision making; Appropriate for age attention/concentration; Appropriate safety awareness; Follows commands  Perception: Appears intact  Perseveration: No perseveration noted  Safety/Judgement: Awareness of environment;Driving appropriateness; Fall prevention;Good awareness of safety precautions; Home safety; Insight into deficits    Hearing: Auditory  Auditory Impairment: Hard of hearing, bilateral  Hearing Aids/Status: Bilateral, With patient    Vision/Perceptual:    Acuity: Within Defined Limits    Corrective Lenses: Glasses    Range of Motion:  AROM: Within functional limits  PROM: Within functional limits                      Strength:  Strength: Within functional limits                Coordination:  Coordination: Within functional limits  Fine Motor Skills-Upper: Left Intact; Right Intact    Gross Motor Skills-Upper: Left Intact; Right Intact    Tone & Sensation:  Tone: Normal  Sensation: Intact                      Balance:  Sitting: Intact  Standing: Impaired  Standing - Static: Good  Standing - Dynamic : Fair    Functional Mobility and Transfers for ADLs:  Bed Mobility:  Rolling: Independent  Supine to Sit: Independent  Sit to Supine: Independent  Scooting: Independent    Transfers:  Sit to Stand: Contact guard assistance  Stand to Sit: Contact guard assistance  Bed to Chair: Contact guard assistance  Bathroom Mobility: Contact guard assistance  Toilet Transfer : Contact guard assistance    ADL Assessment:  Feeding: Independent    Oral Facial Hygiene/Grooming: Stand-by assistance(standing at sink)    Bathing: Contact guard assistance(for standing balance)    Upper Body Dressing: Setup    Lower Body Dressing: Contact guard assistance(for standing balance)    Toileting: Contact guard assistance                ADL Intervention and task modifications:  Patient was educated on the benefits of maintaining activity tolerance, functional mobility, and independence with self care tasks during acute stay. Encouraged patient to be out of bed for all meals, perform daily ADLs (as approved by RN/MD regarding bathing etc), performing functional mobility to/from bathroom, and increasing time OOB daily with assist. Patient educated about the importance of maintaining activity tolerance to ensure safe return home and to baseline. Patient verbalized understanding of education. Cognitive Retraining  Safety/Judgement: Awareness of environment;Driving appropriateness; Fall prevention;Good awareness of safety precautions; Home safety; Insight into deficits    Functional Measure:  Fugl-Harmon Assessment of Motor Recovery after Stroke: BUEs  Reflex Activity  Flexors/Biceps/Fingers: Can be elicited  Extensors/Triceps: Can be elicited  Reflex Subtotal: 4    Volitional Movement Within Synergies  Shoulder Retraction: Full  Shoulder Elevation: Full  Shoulder Abduction (90 degrees): Full  Shoulder External Rotation: Full  Elbow Flexion: Full  Forearm Supination: Full  Shoulder Adduction/Internal Rotation: Full  Elbow Extension: Full  Forearm Pronation: Full  Subtotal: 18    Volitional Movement Mixing Synergies  Hand to Lumbar Spine: Full  Shoulder Flexion (0-90 degrees): Full  Pronation-Supination: Full  Subtotal: 6    Volitional Movement With Little or No Synergy  Shoulder Abduction (0-90 degrees): Full  Shoulder Flexion ( degrees): Full  Pronation/Supination: Full  Subtotal : 6    Normal Reflex Activity  Biceps, Triceps, Finger Flexors: Full  Subtotal : 2    Upper Extremity Total   Upper Extremity Total: 36    Wrist  Stability at 15 Degree Dorsiflexion: Full  Repeated Dorsiflexion/ Volar Flexion: Full  Stability at 15 Degree Dorsiflexion: Full  Repeated Dorsiflexion/ Volar Flexion: Full  Circumduction: Full  Wrist Total: 10    Hand  Mass Flexion: Full  Mass Extension: Full  Grasp A: Full  Grasp B: Full  Grasp C: Full  Grasp D: Full  Grasp E: Full  Hand Total: 14    Coordination/Speed  Tremor: None  Dysmetria: None  Time: <1s(BUEs 4 sec each)  Coordination/Speed Total : 6    Total A-D  Total A-D (Motor Function): 66/66     This is a reliable/valid measure of arm function after a neurological event. It has established value to characterize functional status and for measuring spontaneous and therapy-induced recovery; tests proximal and distal motor functions. Fugl-Harmon Assessment - UE scores recorded between five and 30 days post neurologic event can be used to predict UE recovery at six months post neurologic event. Severe = 0-21 points   Moderately Severe = 22-33 points   Moderate = 34-47 points   Mild = 48-66 points  AVINASH Watson, ALISA Colunga, & MESSI Vick (1992). Measurement of motor recovery after stroke: Outcome assessment and sample size requirements.  Stroke, 23, pp. 0735-1452.   ------------------------------------------------------------------------------------------------------------------------------------------------------------------  MCID:  Stroke:   Simran Ornelas et al, 2001; n = 171; mean age 80 ([address] years; assessed within 16 (12) days of stroke, Acute Stroke)  FMA Motor Scores from Admission to Discharge   10 point increase in FMA Upper Extremity = 1.5 change in discharge FIM   10 point increase in FMA Lower Extremity = 1.9 change in discharge FIM  MDC:   Stroke:   Melisa June et al, 2008, n = 14, mean age = 59.9 (14.6) years, assessed on average 14 (6.5) months post stroke, Chronic Stroke)   FMA = 5.2 points for the Upper Extremity portion of the assessment     Occupational Therapy Evaluation Charge Determination   History Examination Decision-Making   LOW Complexity : Brief history review  LOW Complexity : 1-3 performance deficits relating to physical, cognitive , or psychosocial skils that result in activity limitations and / or participation restrictions  LOW Complexity : No comorbidities that affect functional and no verbal or physical assistance needed to complete eval tasks       Based on the above components, the patient evaluation is determined to be of the following complexity level: LOW   Pain Rating:  No c/o pain    Activity Tolerance:   Good and tolerates ADLs without rest breaks  Please refer to the flowsheet for vital signs taken during this treatment. After treatment patient left in no apparent distress:    Supine in bed, Call bell within reach, and echo tech present     COMMUNICATION/EDUCATION:   The patients plan of care was discussed with: Registered nurse. Patient was educated regarding his deficit(s) of impaired balance as this relates to his diagnosis of possible CVA. He demonstrated Good understanding as evidenced by awareness of situation. Patient and/or family was verbally educated on the BE FAST acronym for signs/symptoms of CVA and TIA. Informed patient to refer to the Stroke Binder for further BE FAST information. All questions answered with patient indicating good understanding. Home safety education was provided and the patient/caregiver indicated understanding., Patient/family have participated as able in goal setting and plan of care. , and Patient/family agree to work toward stated goals and plan of care.    This patients plan of care is appropriate for delegation to RICHA.     Thank you for this referral.  Kamari Blank OT  Time Calculation: 21 mins

## 2020-03-27 NOTE — PROGRESS NOTES
0145: Pnt being transferred to unit with RN. Pnt and RN wearing mask. RN stated that pnt told her that one of his coworkers had tested positive for COVID 19. Nothing in chart or notes previous about this. Assessment completed, Dual Skin Assessment completed with [address] [place] [address]. Admission questions completed, STAND screen completed with no complications. 0210: Dr. Demetri Sal notified that patient completed STAND screen and is ready for a diet to be ordered. Also explained situation with coworker testing positive and asked if there's anything he wants us to do with that information. Dr. Demetri Sal. Ordered regular diet and droplet precautions. 0400: MRI screen completed with patient and form sent to radiology and hard copy in physical chart. Bedside and Verbal shift change report given to April (oncoming nurse) by Antione Kaplan (offgoing nurse). Report included the following information SBAR, Kardex and Recent Results.

## 2020-03-27 NOTE — PROGRESS NOTES
Patient assisted to ambulate to BR and is noted with gait ataxia and stated that he just feels as though his balance is off. Denies dizziness or weakness and not assessed weaknesses noted. Instructed on fall precautions, need to request assistance for ambulation, use of urinal at bedside if urgency occurs before staff can assist to ambulate to the BR. Patient voiced understanding. 1240 Patient is experiencing intermittent nausea associated with movement. Currently lying in bed without complaints. Will continue to observe and intervene as needed.

## 2020-03-27 NOTE — ED TRIAGE NOTES
Pt taken to CT    Pt states he began feeling bad sometime around 2200 tonight when he got up out of bed to use the restroom.   He c/o left facial tingling, left sided weakness

## 2020-03-27 NOTE — DISCHARGE INSTRUCTIONS
HOSPITALIST DISCHARGE INSTRUCTIONS  NAME: Sanchez Winkler   :  1950   MRN:  [id number]     Date/Time:  3/27/2020 2:17 PM    ADMIT DATE: 3/26/2020     DISCHARGE DATE: 3/27/2020     DISCHARGE DIAGNOSIS:  TIA    MEDICATIONS:  · It is important that you take the medication exactly as they are prescribed. · Keep your medication in the bottles provided by the pharmacist and keep a list of the medication names, dosages, and times to be taken in your wallet. · Do not take other medications without consulting your doctor. Pain Management: per above medications    What to do at Home    Recommended diet:  Cardiac Diet    Recommended activity: Activity as tolerated    If you experience any of the following symptoms then please call your primary care physician or return to the emergency room if you cannot get hold of your doctor:  Fever, chills, nausea, vomiting, diarrhea, change in mentation, falling, bleeding, shortness of breath    Follow Up: Follow-up Information     Follow up With Specialties Details Why Contact Timmy Cook NP Nurse Practitioner In 4 weeks please make your appointment with Dr. Pascual Reilly [place] [address]  [address] [place] [address]      Bella Rivera MD Family Practice In 2 weeks TIA follow-up, ask for referral to outpatient balance/vestibular therapy [address]  [phone]              Information obtained by :  I understand that if any problems occur once I am at home I am to contact my physician. I understand and acknowledge receipt of the instructions indicated above.                                                                                                                                            Physician's or R.N.'s Signature                                                                  Date/Time Patient or Representative Signature                                                          Date/Time

## 2020-03-27 NOTE — PROGRESS NOTES
0135: TRANSFER - IN REPORT:    Verbal report received from Pily Nguyễn (name) on Abbe Granda  being received from ED(unit) for routine progression of care      Report consisted of patients Situation, Background, Assessment and   Recommendations(SBAR). Information from the following report(s) SBAR and Kardex was reviewed with the receiving nurse. Opportunity for questions and clarification was provided. Assessment completed upon patients arrival to unit and care assumed.

## 2020-03-27 NOTE — ED PROVIDER NOTES
The patient is a 81-year-old male with a past medical history significant for arthritis, factor V Leiden, hyperlipidemia, kidney stone who presents to the ED with his wife at the bedside with a complaint of sudden onset of dizziness, difficulty with ambulation, unsteady gait, difficulty speaking and tingling to the left side of the face. Symptoms began approximately 1 hour prior to arrival to the ED. The patient states that he feels drunk and is unable to hold steady and maintain his gait. He is nonalcoholic and the only time he has been drunk was when he was 13years old. He denies any headache, blurred vision, neck pain, back pain, chest pain, shortness of breath, nausea, vomiting, abdominal pain, diarrhea, constipation, dysuria, extremity weakness or numbness, skin rash, sick contact and recent travel. Past Medical History:   Diagnosis Date    Arthritis     Factor V Leiden ([facility] [address]     Hyperlipidemia     Kidney stones        Past Surgical History:   Procedure Laterality Date    COLONOSCOPY N/A 12/24/2018    COLONOSCOPY performed by Rosalia Case MD at [address] [place] [address] HX HERNIA REPAIR Right     inguinal         History reviewed. No pertinent family history.     Social History     Socioeconomic History    Marital status:      Spouse name: Not on file    Number of children: Not on file    Years of education: Not on file    Highest education level: Not on file   Occupational History    Not on file   Social Needs    Financial resource strain: Not on file    Food insecurity     Worry: Not on file     Inability: Not on file    Transportation needs     Medical: Not on file     Non-medical: Not on file   Tobacco Use    Smoking status: Never Smoker    Smokeless tobacco: Never Used   Substance and Sexual Activity    Alcohol use: No     Frequency: Never    Drug use: No    Sexual activity: Not on file   Lifestyle    Physical activity     Days per week: Not on file     Minutes per session: Not on file    Stress: Not on file   Relationships    Social connections     Talks on phone: Not on file     Gets together: Not on file     Attends Mosque service: Not on file     Active member of club or organization: Not on file     Attends meetings of clubs or organizations: Not on file     Relationship status: Not on file    Intimate partner violence     Fear of current or ex partner: Not on file     Emotionally abused: Not on file     Physically abused: Not on file     Forced sexual activity: Not on file   Other Topics Concern    Not on file   Social History Narrative    Not on file         ALLERGIES: Patient has no known allergies. Review of Systems   All other systems reviewed and are negative. Vitals:    03/26/20 2305 03/26/20 2321 03/26/20 2335   BP:  (!) 189/92 (!) 194/96   Pulse:  75    Resp:  20    Temp:  98 °F (36.7 °C)    SpO2:  98%    Weight: 98.2 kg (216 lb 7.9 oz)     Height: 5' 8\" (1.727 m)              Physical Exam  Vitals signs and nursing note reviewed. Exam conducted with a chaperone present. CONSTITUTIONAL: Well-appearing; well-nourished; in no apparent distress  HEAD: Normocephalic; atraumatic  EYES: PERRL; EOM intact; conjunctiva and sclera are clear bilaterally. ENT: No rhinorrhea; normal pharynx with no tonsillar hypertrophy; mucous membranes pink/moist, no erythema, no exudate. NECK: Supple; non-tender; no cervical lymphadenopathy  CARD: Normal S1, S2; no murmurs, rubs, or gallops. Regular rate and rhythm. RESP: Normal respiratory effort; breath sounds clear and equal bilaterally; no wheezes, rhonchi, or rales. ABD: Normal bowel sounds; non-distended; non-tender; no palpable organomegaly, no masses, no bruits. Back Exam: Normal inspection; no vertebral point tenderness, no CVA tenderness. Normal range of motion. EXT: Normal ROM in all four extremities; non-tender to palpation; no swelling or deformity; distal pulses are normal, no edema.   SKIN: Warm; dry; no rash.  Charo Sera and oriented x 3, coherent, normal speech; MARYAM-XII grossly intact, no pronator drift; normal gait; finger-to-nose intact normal reflexes; sensory and motor are non-focal.        MDM  Number of Diagnoses or Management Options  Diagnosis management comments: Assessment: 72-year-old male who presents to the ED with acute onset of neurologic symptoms that include dizziness, ataxia, left facial tingling. The patient has no objective findings on exam and has no discernible deficit. His NIH stroke scale is 0. Suspect TIA versus cerebellar insufficiency. Plan: CT scan of the head/EKG/chest x-ray/ CTA head and neck/lab/consult neurology/serial exam/consult hospitalist  / Monitor and Reevaluate. Amount and/or Complexity of Data Reviewed  Clinical lab tests: ordered and reviewed  Tests in the radiology section of CPT®: ordered and reviewed  Tests in the medicine section of CPT®: reviewed and ordered  Discussion of test results with the performing providers: yes  Decide to obtain previous medical records or to obtain history from someone other than the patient: yes  Obtain history from someone other than the patient: yes  Review and summarize past medical records: yes  Discuss the patient with other providers: yes  Independent visualization of images, tracings, or specimens: yes    Risk of Complications, Morbidity, and/or Mortality  Presenting problems: moderate  Diagnostic procedures: moderate           Procedures     CONSULT NOTE:  Ama Sam MD spoke with Dr. Maxine Santiago of [facility] Discussed patient's presentation, history, physical assessment, and available diagnostic results. Will come on screen and see the patient in the ED. 11:15PM.    ED EKG interpretation:  Rhythm: normal sinus rhythm; and regular . Rate (approx.): 84; Axis: left axis deviation; P wave: normal; QRS interval: normal ; ST/T wave: normal; in  Lead: Diffusely; LVH; Other findings: abnormal ekg.  This EKG was interpreted by Mer Quiñones MD,ED Provider. XRAY INTERPRETATION (ED MD)  Chest Xray  No acute process seen. Normal heart size. No bony abnormalities. No infiltrate. Ma Scheuermann, MD 11:42 PM    PROGRESS NOTE:  Pt has been reexamined by Ma Scheuermann, MD all available results have been reviewed with pt and any available family. Pt understands sx, dx, and tx in ED. the patient was seen by the telemetry neurologist.  He is not a candidate for TPA at this time because he has no discernible deficit. He recommends full dose of aspirin at 325 mg by mouth, Plavix 300 mg p.o., admission to the hospital for stroke work-up. care plan has been outlined and questions have been answered. Pt and any available family understands and agrees to need for admission to hospital for further tx not available in ED. Pt is ready for admission. Will consult adult hospitalist.  Written by Mer Quiñones MD,  11:59 PM      Hospitalist Perfect Serve for Admission  12:24 AM    ED Room Number: ER07/07  Patient Name and age:  Analy Adams 77 y.o.  male  Working Diagnosis:   1. TIA (transient ischemic attack)      COVID-19 Suspicion:  no  Readmission: no  Isolation Requirements:  no  Recommended Level of Care:  telemetry  Code Status:  Full Code  Department:[facility] ED - [phone]  Other: Patient is not a TPA candidate. CONSULT NOTE:  Ma Scheuermann, MD spoke with Dr. Lauren Oh of the adult hospitalist team. Discussed patient's presentation, history, physical assessment, and available diagnostic results.  He will evaluate, write orders and admit the patient to the hospital. 12:25 AM    .

## 2020-04-01 ENCOUNTER — TELEPHONE (OUTPATIENT)
Dept: NEUROLOGY | Age: 70
End: 2020-04-01

## 2020-04-01 NOTE — TELEPHONE ENCOUNTER
----- Message from Jim Brown sent at 4/1/2020 12:43 PM EDT -----  Regarding: Dr. Jason Ronquillo first and last name: Kena Fong)      Reason for call: hosp f/up appt      Callback required yes/no and why:yes hosp f/up      Best contact number(s): 21       Details to clarify the request:Pt's wife requested a call to schedule a hosp f/up appt.       Jim Brown

## 2020-04-03 ENCOUNTER — HOSPITAL ENCOUNTER (EMERGENCY)
Age: 70
Discharge: HOME | End: 2020-04-03
Attending: EMERGENCY MEDICINE | Admitting: EMERGENCY MEDICINE
Payer: COMMERCIAL

## 2020-04-03 ENCOUNTER — APPOINTMENT (OUTPATIENT)
Dept: CT IMAGING | Age: 70
End: 2020-04-03
Attending: EMERGENCY MEDICINE
Payer: COMMERCIAL

## 2020-04-03 VITALS
WEIGHT: 216 LBS | RESPIRATION RATE: 18 BRPM | DIASTOLIC BLOOD PRESSURE: 97 MMHG | SYSTOLIC BLOOD PRESSURE: 190 MMHG | HEIGHT: 68 IN | TEMPERATURE: 97.5 F | HEART RATE: 67 BPM | OXYGEN SATURATION: 97 % | BODY MASS INDEX: 32.74 KG/M2

## 2020-04-03 DIAGNOSIS — N39.0 URINARY TRACT INFECTION WITH HEMATURIA, SITE UNSPECIFIED: ICD-10-CM

## 2020-04-03 DIAGNOSIS — R31.9 URINARY TRACT INFECTION WITH HEMATURIA, SITE UNSPECIFIED: ICD-10-CM

## 2020-04-03 DIAGNOSIS — N20.0 KIDNEY STONE: Primary | ICD-10-CM

## 2020-04-03 LAB
ANION GAP SERPL CALC-SCNC: 6 MMOL/L (ref 5–15)
APPEARANCE UR: ABNORMAL
BACTERIA URNS QL MICRO: NEGATIVE /HPF
BASOPHILS # BLD: 0 K/UL (ref 0–0.1)
BASOPHILS NFR BLD: 0 % (ref 0–1)
BILIRUB UR QL: NEGATIVE
BUN SERPL-MCNC: 17 MG/DL (ref 6–20)
BUN/CREAT SERPL: 13 (ref 12–20)
CALCIUM SERPL-MCNC: 8.9 MG/DL (ref 8.5–10.1)
CHLORIDE SERPL-SCNC: 105 MMOL/L (ref 97–108)
CO2 SERPL-SCNC: 25 MMOL/L (ref 21–32)
COLOR UR: ABNORMAL
COMMENT, HOLDF: NORMAL
CREAT SERPL-MCNC: 1.31 MG/DL (ref 0.7–1.3)
DIFFERENTIAL METHOD BLD: ABNORMAL
EOSINOPHIL # BLD: 0.1 K/UL (ref 0–0.4)
EOSINOPHIL NFR BLD: 1 % (ref 0–7)
EPITH CASTS URNS QL MICRO: ABNORMAL /LPF
ERYTHROCYTE [DISTWIDTH] IN BLOOD BY AUTOMATED COUNT: 12.7 % (ref 11.5–14.5)
GLUCOSE SERPL-MCNC: 156 MG/DL (ref 65–100)
GLUCOSE UR STRIP.AUTO-MCNC: NEGATIVE MG/DL
HCT VFR BLD AUTO: 45.2 % (ref 36.6–50.3)
HGB BLD-MCNC: 15.6 G/DL (ref 12.1–17)
HGB UR QL STRIP: ABNORMAL
HYALINE CASTS URNS QL MICRO: ABNORMAL /LPF (ref 0–5)
IMM GRANULOCYTES # BLD AUTO: 0.1 K/UL (ref 0–0.04)
IMM GRANULOCYTES NFR BLD AUTO: 1 % (ref 0–0.5)
KETONES UR QL STRIP.AUTO: 15 MG/DL
LEUKOCYTE ESTERASE UR QL STRIP.AUTO: ABNORMAL
LYMPHOCYTES # BLD: 0.9 K/UL (ref 0.8–3.5)
LYMPHOCYTES NFR BLD: 9 % (ref 12–49)
MCH RBC QN AUTO: 30.5 PG (ref 26–34)
MCHC RBC AUTO-ENTMCNC: 34.5 G/DL (ref 30–36.5)
MCV RBC AUTO: 88.3 FL (ref 80–99)
MONOCYTES # BLD: 0.6 K/UL (ref 0–1)
MONOCYTES NFR BLD: 5 % (ref 5–13)
NEUTS SEG # BLD: 8.9 K/UL (ref 1.8–8)
NEUTS SEG NFR BLD: 84 % (ref 32–75)
NITRITE UR QL STRIP.AUTO: POSITIVE
NRBC # BLD: 0 K/UL (ref 0–0.01)
NRBC BLD-RTO: 0 PER 100 WBC
PH UR STRIP: 7.5 [PH] (ref 5–8)
PLATELET # BLD AUTO: 168 K/UL (ref 150–400)
PMV BLD AUTO: 11.2 FL (ref 8.9–12.9)
POTASSIUM SERPL-SCNC: 3.9 MMOL/L (ref 3.5–5.1)
PROT UR STRIP-MCNC: NEGATIVE MG/DL
RBC # BLD AUTO: 5.12 M/UL (ref 4.1–5.7)
RBC #/AREA URNS HPF: ABNORMAL /HPF (ref 0–5)
SAMPLES BEING HELD,HOLD: NORMAL
SODIUM SERPL-SCNC: 136 MMOL/L (ref 136–145)
SP GR UR REFRACTOMETRY: 1.02 (ref 1–1.03)
UR CULT HOLD, URHOLD: NORMAL
UROBILINOGEN UR QL STRIP.AUTO: 1 EU/DL (ref 0.2–1)
WBC # BLD AUTO: 10.5 K/UL (ref 4.1–11.1)
WBC URNS QL MICRO: ABNORMAL /HPF (ref 0–4)

## 2020-04-03 PROCEDURE — 99282 EMERGENCY DEPT VISIT SF MDM: CPT

## 2020-04-03 PROCEDURE — 85025 COMPLETE CBC W/AUTO DIFF WBC: CPT

## 2020-04-03 PROCEDURE — 96374 THER/PROPH/DIAG INJ IV PUSH: CPT

## 2020-04-03 PROCEDURE — 36415 COLL VENOUS BLD VENIPUNCTURE: CPT

## 2020-04-03 PROCEDURE — 74011250636 HC RX REV CODE- 250/636: Performed by: EMERGENCY MEDICINE

## 2020-04-03 PROCEDURE — 80048 BASIC METABOLIC PNL TOTAL CA: CPT

## 2020-04-03 PROCEDURE — 74176 CT ABD & PELVIS W/O CONTRAST: CPT

## 2020-04-03 PROCEDURE — 87086 URINE CULTURE/COLONY COUNT: CPT

## 2020-04-03 PROCEDURE — 81001 URINALYSIS AUTO W/SCOPE: CPT

## 2020-04-03 RX ORDER — NAPROXEN 500 MG/1
500 TABLET ORAL 2 TIMES DAILY WITH MEALS
Qty: 20 TAB | Refills: 0 | Status: SHIPPED | OUTPATIENT
Start: 2020-04-03 | End: 2020-04-13

## 2020-04-03 RX ORDER — CEPHALEXIN 500 MG/1
500 CAPSULE ORAL 4 TIMES DAILY
Qty: 28 CAP | Refills: 0 | Status: SHIPPED | OUTPATIENT
Start: 2020-04-03 | End: 2020-04-10

## 2020-04-03 RX ORDER — KETOROLAC TROMETHAMINE 30 MG/ML
15 INJECTION, SOLUTION INTRAMUSCULAR; INTRAVENOUS
Status: COMPLETED | OUTPATIENT
Start: 2020-04-03 | End: 2020-04-03

## 2020-04-03 RX ORDER — HYDROCODONE BITARTRATE AND ACETAMINOPHEN 5; 325 MG/1; MG/1
1 TABLET ORAL
Qty: 10 TAB | Refills: 0 | Status: SHIPPED | OUTPATIENT
Start: 2020-04-03 | End: 2020-04-06

## 2020-04-03 RX ORDER — CEPHALEXIN 250 MG/1
500 CAPSULE ORAL
Status: DISCONTINUED | OUTPATIENT
Start: 2020-04-03 | End: 2020-04-04 | Stop reason: HOSPADM

## 2020-04-03 RX ORDER — ONDANSETRON 8 MG/1
8 TABLET, ORALLY DISINTEGRATING ORAL
Qty: 12 TAB | Refills: 0 | Status: SHIPPED | OUTPATIENT
Start: 2020-04-03

## 2020-04-03 RX ADMIN — SODIUM CHLORIDE 1000 ML: 900 INJECTION, SOLUTION INTRAVENOUS at 22:37

## 2020-04-03 RX ADMIN — KETOROLAC TROMETHAMINE 15 MG: 30 INJECTION, SOLUTION INTRAMUSCULAR at 22:37

## 2020-04-04 NOTE — ED PROVIDER NOTES
71-year-old male with a history of TIA presents with right flank pain that feels similar to his prior kidney stones. He rates his pain 9 out of 10. He denies any fevers or chills. He has had nausea but no vomiting. He denies any hematuria. He has required lithotripsy and laser lithotripsy for prior stones. Flank Pain    Pertinent negatives include no chest pain, no fever, no headaches and no abdominal pain. Past Medical History:   Diagnosis Date    Arthritis     Factor V Leiden ([address]     Hyperlipidemia     Kidney stones        Past Surgical History:   Procedure Laterality Date    COLONOSCOPY N/A 12/24/2018    COLONOSCOPY performed by Sarai España MD at [address] [facility] HX HERNIA REPAIR Right     inguinal         History reviewed. No pertinent family history.     Social History     Socioeconomic History    Marital status:      Spouse name: Not on file    Number of children: Not on file    Years of education: Not on file    Highest education level: Not on file   Occupational History    Not on file   Social Needs    Financial resource strain: Not on file    Food insecurity     Worry: Not on file     Inability: Not on file    Transportation needs     Medical: Not on file     Non-medical: Not on file   Tobacco Use    Smoking status: Never Smoker    Smokeless tobacco: Never Used   Substance and Sexual Activity    Alcohol use: No     Frequency: Never    Drug use: No    Sexual activity: Not on file   Lifestyle    Physical activity     Days per week: Not on file     Minutes per session: Not on file    Stress: Not on file   Relationships    Social connections     Talks on phone: Not on file     Gets together: Not on file     Attends Yazdanism service: Not on file     Active member of club or organization: Not on file     Attends meetings of clubs or organizations: Not on file     Relationship status: Not on file    Intimate partner violence     Fear of current or ex partner: Not on file     Emotionally abused: Not on file     Physically abused: Not on file     Forced sexual activity: Not on file   Other Topics Concern    Not on file   Social History Narrative    Not on file         ALLERGIES: Patient has no known allergies. Review of Systems   Constitutional: Negative for chills and fever. HENT: Negative for ear pain and sore throat. Eyes: Negative for pain. Respiratory: Negative for chest tightness and shortness of breath. Cardiovascular: Negative for chest pain. Gastrointestinal: Negative for abdominal pain. Genitourinary: Positive for flank pain. Musculoskeletal: Negative for back pain. Skin: Negative for rash. Neurological: Negative for headaches. All other systems reviewed and are negative. Vitals:    04/03/20 2134   BP: (!) 190/97   Pulse: 67   Resp: 18   Temp: 97.5 °F (36.4 °C)   SpO2: 97%   Weight: 98 kg (216 lb)   Height: 5' 8\" (1.727 m)            Physical Exam  Vitals signs and nursing note reviewed. Constitutional:       General: He is not in acute distress. HENT:      Head: Normocephalic and atraumatic. Eyes:      General: No scleral icterus. Conjunctiva/sclera: Conjunctivae normal.      Pupils: Pupils are equal, round, and reactive to light. Neck:      Musculoskeletal: No neck rigidity. Trachea: No tracheal deviation. Cardiovascular:      Rate and Rhythm: Normal rate and regular rhythm. Pulmonary:      Effort: Pulmonary effort is normal. No respiratory distress. Breath sounds: Normal breath sounds. No stridor. Abdominal:      General: There is no distension. Palpations: Abdomen is soft. Tenderness: There is no abdominal tenderness. Genitourinary:     Comments: deferred  Musculoskeletal:         General: No deformity. Skin:     General: Skin is warm and dry. Neurological:      General: No focal deficit present. Mental Status: He is alert.    Psychiatric:         Mood and Affect: Mood normal. Behavior: Behavior normal.          MDM  Number of Diagnoses or Management Options  Kidney stone:   Urinary tract infection with hematuria, site unspecified:   Diagnosis management comments: CT scan shows 5 mm stone with 5 mm stone in the bladder. UA with positive nitrites. Will cover with Keflex. Advised to follow-up closely with urologist.  Given prescriptions for Naprosyn, Norco, Zofran. Given return precautions.          Procedures

## 2020-04-04 NOTE — ED TRIAGE NOTES
Pt states he believes he has a kidney stone. C/O R sided flank pain that started two hours ago. Denies any urinary problem.  Hx of kidney stones

## 2020-04-06 ENCOUNTER — PATIENT OUTREACH (OUTPATIENT)
Dept: INTERNAL MEDICINE CLINIC | Age: 70
End: 2020-04-06

## 2020-04-06 LAB
BACTERIA SPEC CULT: NORMAL
SERVICE CMNT-IMP: NORMAL

## 2020-04-06 NOTE — PROGRESS NOTES
COVID-19 Screening Initial Follow-up Note    Patient contacted regarding COVID-19  risk. Care Transition Nurse/ Ambulatory Care Manager contacted the patient by telephone to perform post discharge assessment. Verified name and  with patient as identifiers. Provided introduction to self, and explanation of the CTN/ACM role, and reason for call due to risk factors for infection and/or exposure to COVID-19. Symptoms reviewed with patient who verbalized the following symptoms: no new/worsening symptoms       Due to no new or worsening symptoms encounter was not routed to provider for escalation. Patient has following risk factors of: none. CTN/ACM reviewed discharge instructions, medical action plan and red flags such as increased shortness of breath, increasing fever and signs of decompensation with patient who verbalized understanding. Discussed exposure protocols and quarantine with CDC Guidelines What to do if you are sick with coronavirus disease 2019 Patient who was given an opportunity for questions and concerns. The patient agrees to contact the [facility] exposure line [phone], local [facility] department [facility]  [phone] and PCP office for questions related to their healthcare. CTN/ACM provided contact information for future reference. Reviewed and educated patient on any new and changed medications related to discharge diagnosis. Has follow up appointment with Urologist at [facility] for kidney stone.     Plan for follow-up call in 14 days based on severity of symptoms and risk factors

## 2020-04-20 ENCOUNTER — PATIENT OUTREACH (OUTPATIENT)
Dept: INTERNAL MEDICINE CLINIC | Age: 70
End: 2020-04-20

## 2020-04-21 ENCOUNTER — OFFICE VISIT (OUTPATIENT)
Dept: NEUROLOGY | Age: 70
End: 2020-04-21

## 2020-04-21 VITALS
OXYGEN SATURATION: 98 % | DIASTOLIC BLOOD PRESSURE: 82 MMHG | HEART RATE: 61 BPM | TEMPERATURE: 98 F | HEIGHT: 68 IN | RESPIRATION RATE: 20 BRPM | SYSTOLIC BLOOD PRESSURE: 128 MMHG

## 2020-04-21 DIAGNOSIS — I65.1 BASILAR ARTERY STENOSIS: ICD-10-CM

## 2020-04-21 DIAGNOSIS — I65.02 VERTEBRAL ARTERY NARROWING, LEFT: ICD-10-CM

## 2020-04-21 DIAGNOSIS — E78.2 MIXED HYPERLIPIDEMIA: ICD-10-CM

## 2020-04-21 DIAGNOSIS — G45.9 TIA (TRANSIENT ISCHEMIC ATTACK): Primary | ICD-10-CM

## 2020-04-21 DIAGNOSIS — G45.0 VBI (VERTEBROBASILAR INSUFFICIENCY): ICD-10-CM

## 2020-04-21 RX ORDER — ASPIRIN 81 MG/1
TABLET ORAL DAILY
COMMUNITY

## 2020-04-21 RX ORDER — ROSUVASTATIN CALCIUM 20 MG/1
TABLET, COATED ORAL
COMMUNITY
Start: 2020-03-27

## 2020-04-21 RX ORDER — CLOPIDOGREL BISULFATE 75 MG/1
75 TABLET ORAL DAILY
Qty: 30 TAB | Refills: 5 | Status: SHIPPED | OUTPATIENT
Start: 2020-04-21

## 2020-04-21 NOTE — PATIENT INSTRUCTIONS
High Cholesterol: Care Instructions Your Care Instructions Cholesterol is a type of fat in your blood. It is needed for many body functions, such as making new cells. Cholesterol is made by your body. It also comes from food you eat. High cholesterol means that you have too much of the fat in your blood. This raises your risk of a heart attack and stroke. LDL and HDL are part of your total cholesterol. LDL is the \"bad\" cholesterol. High LDL can raise your risk for heart disease, heart attack, and stroke. HDL is the \"good\" cholesterol. It helps clear bad cholesterol from the body. High HDL is linked with a lower risk of heart disease, heart attack, and stroke. Your cholesterol levels help your doctor find out your risk for having a heart attack or stroke. You and your doctor can talk about whether you need to lower your risk and what treatment is best for you. A heart-healthy lifestyle along with medicines can help lower your cholesterol and your risk. The way you choose to lower your risk will depend on how high your risk is for heart attack and stroke. It will also depend on how you feel about taking medicines. Follow-up care is a key part of your treatment and safety. Be sure to make and go to all appointments, and call your doctor if you are having problems. It's also a good idea to know your test results and keep a list of the medicines you take. How can you care for yourself at home? · Eat a variety of foods every day. Good choices include fruits, vegetables, whole grains (like oatmeal), dried beans and peas, nuts and seeds, soy products (like tofu), and fat-free or low-fat dairy products. · Replace butter, margarine, and hydrogenated or partially hydrogenated oils with olive and canola oils. (Canola oil margarine without trans fat is fine.) · Replace red meat with fish, poultry, and soy protein (like tofu). · Limit processed and packaged foods like chips, crackers, and cookies. · Bake, broil, or steam foods. Don't welch them. · Be physically active. Get at least 30 minutes of exercise on most days of the week. Walking is a good choice. You also may want to do other activities, such as running, swimming, cycling, or playing tennis or team sports. · Stay at a healthy weight or lose weight by making the changes in eating and physical activity listed above. Losing just a small amount of weight, even 5 to 10 pounds, can reduce your risk for having a heart attack or stroke. · Do not smoke. When should you call for help? Watch closely for changes in your health, and be sure to contact your doctor if: 
  · You need help making lifestyle changes.  
  · You have questions about your medicine. Where can you learn more? Go to http://wesAttention Sciencespatel.info/ Enter I577 in the search box to learn more about \"High Cholesterol: Care Instructions. \" Current as of: December 15, 2019Content Version: 12.4 © 5637-6045 Healthwise, Incorporated. Care instructions adapted under license by [employer] (which disclaims liability or warranty for this information). If you have questions about a medical condition or this instruction, always ask your healthcare professional. [address] any warranty or liability for your use of this information. Learning About How to Prevent a Stroke What is a stroke? A stroke occurs when a blood vessel in the brain bursts or is blocked by a blood clot. Without blood and the oxygen it carries, part of the brain starts to die. The part of the body controlled by the damaged area of the brain can't work properly. But there are many things you can do to help lower your stroke risk. What increases your risk for stroke? A risk factor is anything that makes you more likely to have a particular health problem. Risk factors for stroke that you can treat or change include: · Health problems like high blood pressure, atrial fibrillation, diabetes, and high cholesterol. · Smoking. · Heavy use of alcohol. · Being overweight. · Not getting enough physical activity. Risk factors you can't change include: · Age. The risk of stroke goes up as you get older. · Race.  Americans, Native Americans, and [place] Natives have a higher risk than those of other races. · Being female. Women have a higher risk of stroke than men. · Family history of stroke. Your doctor can help you know your risk. Then you and your can doctor talk about whether you need to lower it. What can you do to prevent a stroke? · Treat any health problems you have that raise your risk. · Adopt a heart-healthy lifestyle: ? Don't smoke. If you need help quitting, talk to your doctor about stop-smoking programs and medicines. These can increase your chances of quitting for good. ? Limit alcohol to 2 drinks a day for men and 1 drink a day for women. ? Stay at a healthy weight. Lose weight if you need to. 
? If your doctor recommends it, get more exercise. Walking is a good choice. Bit by bit, increase the amount you walk every day. Try for at least 30 minutes on most days of the week. ? Eat heart-healthy foods. These include fruits, vegetables, high-fiber foods, and fish. Choose foods that are low in sodium, saturated fat, and trans fat. · Decide with your doctor whether you will also take medicines to help lower your risk. For example, you and your doctor may decide you will take a medicine that prevents blood clots. What are the symptoms of a stroke? The brain damage from a stroke starts within minutes. That's why it's so important to know the symptoms of stroke and to act fast. Quick treatment can help limit damage to the brain so that you have fewer problems after the stroke. FAST is a simple way to remember the main symptoms of stroke.  Recognizing these symptoms helps you know when to call for medical help. FAST stands for: · F ace drooping. · A rm weakness. · S peech difficulty. · T carly to call  911. Follow-up care is a key part of your treatment and safety. Be sure to make and go to all appointments, and call your doctor if you are having problems. It's also a good idea to know your test results and keep a list of the medicines you take. Where can you learn more? Go to http://wes-patel.info/ Enter G757 in the search box to learn more about \"Learning About How to Prevent a Stroke. \" Current as of: July 14, 2019Content Version: 12.4 © 5596-2564 Healthwise, Incorporated. Care instructions adapted under license by [employer] (which disclaims liability or warranty for this information). If you have questions about a medical condition or this instruction, always ask your healthcare professional. [place] [address] any warranty or liability for your use of this information. Learning About How to Prevent Another Stroke What can you do to prevent another stroke? After a stroke, people feel lots of different emotions. Some people are worried that they could have another stroke. Or they may feel overwhelmed by how much there is to learn and do. Some people feel sad or depressed. No matter what emotions you are feeling, you can give yourself some control and peace of mind by making a plan to lower your risk of having another stroke. Take your medicines You'll need to take medicines to help prevent another stroke. Be sure to take your medicines exactly as prescribed. And don't stop taking them unless your doctor tells you to. If you stop taking your medicines, you can increase your risk of having another stroke. Some of the medicines your doctor may prescribe include: · Aspirin or some other blood thinner to prevent blood clots. · Statins and other medicines to lower cholesterol. · Blood pressure medicines to lower blood pressure. Manage other health problems You can help lower your chance of having another stroke by managing certain other health problems. Problems that increase your risk of having another stroke include: · High blood pressure. · High cholesterol. · Atrial fibrillation. · Diabetes. If you have any of these health problems, you can manage them with healthy lifestyle changes along with medicine. Adopt a healthy lifestyle · Do not smoke or allow others to smoke around you. If you need help quitting, talk to your doctor about stop-smoking programs and medicines. These can increase your chances of quitting for good. Smoking makes a stroke more likely. · Limit alcohol to 2 drinks a day for men and 1 drink a day for women. · Lose weight if you need to. Controlling your weight will help you keep your heart and body healthy. · Be active. Ask your doctor what type and level of activity is safe for you. · Eat heart-healthy foods, like fruits, vegetables, and high-fiber foods. It's also important to: · Get a flu shot every year. · Ask for help if you think you are depressed. Do stroke rehab Taking part in a stroke rehabilitation (rehab) program will help you to regain skills you lost or make the most of your abilities after a stroke. It also helps you take steps to prevent another stroke. Your rehab team will give you education and support to help you build new, healthy habits. You'll learn how to manage any other health problems that you might have. Daniel Linda also learn how to exercise safely, eat a healthy diet, and quit smoking if you smoke. Daniel Linda work with your team to decide what lifestyle choices are best for you. If your doctor hasn't already suggested it, ask him or her if stroke rehab is right for you. Know stroke symptoms Make sure you know the symptoms of stroke. FAST is a simple way to remember.  Recognizing these symptoms helps you know when to call for medical help. FAST stands for: · F ace drooping. · A rm weakness. · S peech difficulty. · T carly to call  911. Follow-up care is a key part of your treatment and safety. Be sure to make and go to all appointments, and call your doctor if you are having problems. It's also a good idea to know your test results and keep a list of the medicines you take. Where can you learn more? Go to http://wes-patel.info/ Enter Q810 in the search box to learn more about \"Learning About How to Prevent Another Stroke. \" Current as of: July 14, 2019Content Version: 12.4 © 2289-7059 Healthwise, [employer]. Care instructions adapted under license by [employer] (which disclaims liability or warranty for this information). If you have questions about a medical condition or this instruction, always ask your healthcare professional. [place] [address] any warranty or liability for your use of this information. Transient Ischemic Attack: Care Instructions Your Care Instructions A transient ischemic attack (TIA) is when blood flow to a part of your brain is blocked for a short time. A TIA is like a stroke but usually lasts only a few minutes. A TIA does not cause lasting brain damage. Any vision problems, slurred speech, or other symptoms usually go away in 10 to 20 minutes. But they may last for up to 24 hours. TIAs are often warning signs of a stroke. Some people who have a TIA may have a stroke in the future. A stroke can cause symptoms like those of a TIA. But a stroke causes lasting damage to your brain. You can take steps to help prevent a stroke. One thing you can do is get early treatment. If you have other new symptoms, or if your symptoms do not get better, go back to the emergency room or call your doctor right away. Getting treatment right away may prevent long-term brain damage caused by a stroke. The doctor has checked you carefully, but problems can develop later. If you notice any problems or new symptoms, get medical treatment right away. Follow-up care is a key part of your treatment and safety. Be sure to make and go to all appointments, and call your doctor if you are having problems. It's also a good idea to know your test results and keep a list of the medicines you take. How can you care for yourself at home? Medicines 
  · Be safe with medicines. Take your medicines exactly as prescribed. Call your doctor if you think you are having a problem with your medicine.  
  · If you take a blood thinner, such as aspirin, be sure you get instructions about how to take your medicine safely. Blood thinners can cause serious bleeding problems.  
  · Call your doctor if you are not able to take your medicines for any reason.  
  · Do not take any over-the-counter medicines or herbal products without talking to your doctor first.  
  · If you take birth control pills or hormone therapy, talk to your doctor. Ask if these treatments are right for you.  
 Lifestyle changes 
  · Do not smoke. If you need help quitting, talk to your doctor about stop-smoking programs and medicines.  
  · Be active. If your doctor recommends it, get more exercise. Walking is a good choice. Bit by bit, increase the amount you walk every day. Try for at least 30 minutes on most days of the week. You also may want to swim, bike, or do other activities.  
  · Eat heart-healthy foods. These include fruits, vegetables, high-fiber foods, lean meats, beans, peas, nuts, seeds, and soy products, and foods that are low in sodium, saturated fat, and trans fat.  
  · Stay at a healthy weight. Lose weight if you need to.  
  · Limit alcohol to 2 drinks a day for men and 1 drink a day for women.  
 Staying healthy 
  · Manage other health problems such as diabetes, high blood pressure, and high cholesterol.  
  · Get the flu vaccine every year. When should you call for help? Call 911 anytime you think you may need emergency care. For example, call if: 
  · You have new or worse symptoms of a stroke. These may include: 
? Sudden numbness, tingling, weakness, or loss of movement in your face, arm, or leg, especially on only one side of your body. ? Sudden vision changes. ? Sudden trouble speaking. ? Sudden confusion or trouble understanding simple statements. ? Sudden problems with walking or balance. ? A sudden, severe headache that is different from past headaches. Call  911 even if these symptoms go away in a few minutes.  
  · You feel like you are having another TIA.  
 Watch closely for changes in your health, and be sure to contact your doctor if you have any problems. Where can you learn more? Go to http://wes-patel.info/ Enter ([phone] 0388 2505 in the search box to learn more about \"Transient Ischemic Attack: Care Instructions. \" Current as of: July 14, 2019Content Version: 12.4 © 1651-7205 Healthwise, Incorporated. Care instructions adapted under license by [employer] (which disclaims liability or warranty for this information). If you have questions about a medical condition or this instruction, always ask your healthcare professional. [address] any warranty or liability for your use of this information.

## 2020-04-21 NOTE — LETTER
4/21/20 Patient: Kosta Mistry YOB: 1950 Date of Visit: 4/21/2020 Scotty Arvizu MD 
[address] [facility] [address] [place] [id number] VIA Facsimile: [phone] Dear Scotty Arvizu MD, Thank you for referring Mr. Anat Alba to [facility] for evaluation. My notes for this consultation are attached. If you have questions, please do not hesitate to call me. I look forward to following your patient along with you. Sincerely, Henrry Olivares MD

## 2020-04-21 NOTE — PROGRESS NOTES
NEUROLOGY CLINIC NOTE    Patient ID:  Erasmo Garcia  <[id number]>  19 y.o.  1950    Date of Consultation:  April 21, 2020    Reason for Consultation:  TIA    Chief Complaint   Patient presents with   [facility] Follow Up     TIA        History of Present Illness: There are no active problems to display for this patient. Past Medical History:   Diagnosis Date    Arthritis     Factor V Leiden ([address]     Hyperlipidemia     Kidney disease     stone    TIA (transient ischemic attack)      Past Surgical History:   Procedure Laterality Date    HX HERNIA REPAIR Right      Prior to Admission medications    Medication Sig Start Date End Date Taking? Authorizing Provider   rosuvastatin (CRESTOR) 20 mg tablet TK 1 T PO Q NIGHT 3/27/20  Yes Provider, Historical   aspirin delayed-release 81 mg tablet Take  by mouth daily. Yes Provider, Historical   clopidogreL (Plavix) 75 mg tab Take 1 Tab by mouth daily. 4/21/20  Yes MD NENITA Mccullough    Social History     Tobacco Use    Smoking status: Never Smoker    Smokeless tobacco: Never Used   Substance Use Topics    Alcohol use: Never     Frequency: Never    Drug use: Never     History reviewed. No pertinent family history. Subjective:      Erasmo Garcia is a 77 y.o. RHWM with history of arthritis, factor V leiden, hyperlipidemia and left vertebral artery stenosis who is here for further evaluation after a recent TIA. Per patient condition started 3/26/2020. Patient had abrupt onset of dizziness, difficulty walking and wobbliness, difficulty speaking and tingling in the left side of his face. Patient was seen in the ER. In ER blood pressure was stable. Head CT without contrast did not reveal any acute process. Head and neck CTA revealed no ICA stenosis. Moderate to severe stenosis of the proximal left vertebral artery. Right vertebral artery is dominant. Left vertebral artery terminates in PICA. Mild mid basilar stenosis. Diminutive basilar artery. Fetal circulations with the P1 and P2 segments on the right and left. Multilevel foraminal stenosis of the cervical spine. Laboratory work-up revealed unremarkable CBC, CMP, troponin with elevated LDL at 129.4. Echocardiogram done revealed EF of 55 to 60%. Bubble study did not reveal any PFO. Brain MRI without contrast did not reveal any acute stroke. Mild white matter disease. Per patient condition improved the next day. Patient was seen by Dr. Timothy Bai (neurology) and only found some gait instability but no other focal findings. Patient admits to not taking his Crestor daily and only takes aspirin every other day. Patient was discharge 3/27/2020 on Crestor 20 mg daily and 81 mg of aspirin daily. Since then, patient reports no recurrence of his symptoms. He reports compliance with Crestor 20 mg daily and aspirin 81 mg every other day. He does mention previous episode of left-sided facial numbness and that was when they found that he had a left vertebral artery stenosis. No new complaints. Outside reports reviewed: ER records, radiology reports, lab reports, historical medical records. Review of Systems:    A comprehensive review of systems was performed:   Constitutional: positive for none  Eyes: positive for none  Ears, nose, mouth, throat, and face: positive for none  Respiratory: positive for none  Cardiovascular: positive for none  Gastrointestinal: positive for none  Genitourinary: positive for none  Integument/breast: positive for none  Hematologic/lymphatic: positive for none  Musculoskeletal: positive for none  Neurological: positive for none  Behavioral/Psych: positive for none  Endocrine: positive for none  Allergic/Immunologic: positive for none      Objective:     Visit Vitals  /82   Pulse 61   Temp 98 °F (36.7 °C)   Resp 20   Ht 5' 8\" (1.727 m)   SpO2 98%       PHYSICAL EXAM:    NEUROLOGICAL EXAM:    Appearance:   The patient is well developed, well nourished, provides a coherent history and is in no acute distress. Mental Status: Oriented to time, place and person. Fluent, no aphasia or dysarthria. Mood and affect appropriate. Cranial Nerves:   Intact visual fields. KAN, EOM's full, no nystagmus, no ptosis. Facial sensation is normal. Corneal reflexes are intact. Facial movement is symmetric. Hearing is normal bilaterally. Palate is midline with normal elevation. Sternocleidomastoid and trapezius muscles are normal. Tongue is midline. Motor:  5/5 strength in upper and lower proximal and distal muscles. Normal bulk and tone. No fasciculations. No pronator drift. Reflexes:   Deep tendon reflexes 1+/4 and symmetrical.    Sensory:   Normal to cold. Gait:  Normal gait. No Romberg. Slight problems tandem walking. Tremor:   No tremor noted. Cerebellar:  Intact FTN/DUSTIN/HTS. Imaging  CT Head, head/neck CTA, brain MRI: reviewed    Labs Reviewed      Assessment:   TIA  VBI  Left vertebral artery narrowing  Basilar artery stenosis  Mixed hyperlipidemia    Plan:   Neurological examination is currently nonfocal except for some mild issues with tandem walking. No residual deficits. Event consistent with a TIA involving the posterior circulation. Patient was already on aspirin when the TIA occurred. Given the issue of vertebral artery narrowing and diminutive basilar artery, it is prudent for patient to be on dual antiplatelet therapy. Continue aspirin 81 mg daily and add on Plavix 75 mg daily for stroke prophylaxis. Prescriptions provided. Patient is advised to call 911 if new deficits occur. Patient was advised per DMV regulation he cannot drive for 3 months. He understood. 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 Dual antiplatelet therapy as above.   Patient was advised to hydrate adequately on a daily basis and maintain adequate blood pressures. Head and neck CTA revealed left vertebral artery significant stenosis. This finding would require dual antiplatelet therapy as ordered above. If patient continues to be symptomatic then he may benefit from referral to neuro interventional specialist.    Diminutive basal artery but still having adequate flow. Dual antiplatelet therapy as above. Monitor for now. LDL in the hospital was highly elevated. Given TIA it should be less than 70. Continue Crestor 20 mg daily. Recommend PCP rechecking lipid panel in a month. Advised strict compliance with dietary modifications and medications for hyperlipidemia. Visit lasted 45 minutes. Greater than 50% was spent reviewing his medical records during his hospitalization as summarized above, discussion about his condition, potential etiology, prognosis, stroke prevention and prophylaxis, discussion about need for dual antiplatelet therapy given vertebrobasilar atherosclerotic changes, compliance with dietary modifications and medications for hyperlipidemia, driving restrictions    This note was created using voice recognition software. Despite editing, there may be syntax errors.

## 2020-07-21 ENCOUNTER — OFFICE VISIT (OUTPATIENT)
Dept: NEUROLOGY | Age: 70
End: 2020-07-21

## 2020-07-21 VITALS
RESPIRATION RATE: 20 BRPM | TEMPERATURE: 97.3 F | DIASTOLIC BLOOD PRESSURE: 82 MMHG | BODY MASS INDEX: 32.84 KG/M2 | HEART RATE: 68 BPM | HEIGHT: 68 IN | OXYGEN SATURATION: 98 % | SYSTOLIC BLOOD PRESSURE: 140 MMHG

## 2020-07-21 DIAGNOSIS — I65.1 BASILAR ARTERY STENOSIS: ICD-10-CM

## 2020-07-21 DIAGNOSIS — G45.9 TIA (TRANSIENT ISCHEMIC ATTACK): Primary | ICD-10-CM

## 2020-07-21 DIAGNOSIS — E78.2 MIXED HYPERLIPIDEMIA: ICD-10-CM

## 2020-07-21 DIAGNOSIS — I65.02 VERTEBRAL ARTERY NARROWING, LEFT: ICD-10-CM

## 2020-07-21 DIAGNOSIS — G45.0 VBI (VERTEBROBASILAR INSUFFICIENCY): ICD-10-CM

## 2020-07-21 NOTE — PROGRESS NOTES
NEUROLOGY CLINIC NOTE    Patient ID:  Addis Aiken  [id number]  72 y.o.  1950    Date of Visit:  July 21, 2020    Reason for Visit:  TIA    Chief Complaint   Patient presents with    Follow-up     TIA        History of Present Illness:     Patient Active Problem List    Diagnosis Date Noted    TIA (transient ischemic attack) 03/27/2020     Past Medical History:   Diagnosis Date    Arthritis     Factor V Leiden ([address]     Hyperlipidemia     Kidney disease     stone    Kidney stones     TIA (transient ischemic attack)      Past Surgical History:   Procedure Laterality Date    COLONOSCOPY N/A 12/24/2018    COLONOSCOPY performed by Cosme Norris MD at [place] [address] HX HERNIA REPAIR Right     inguinal    HX HERNIA REPAIR Right      Prior to Admission medications    Medication Sig Start Date End Date Taking? Authorizing Provider   clopidogreL (Plavix) 75 mg tab Take 1 Tab by mouth daily. 4/21/20  Yes Demetria Jerry MD   ondansetron (ZOFRAN ODT) 8 mg disintegrating tablet Take 1 Tab by mouth every eight (8) hours as needed for Nausea. 4/3/20  Yes Vivek Hernandez MD   rosuvastatin (CRESTOR) 20 mg tablet Take 1 Tab by mouth nightly. 3/27/20  Yes Javi Burrows MD   rosuvastatin (CRESTOR) 20 mg tablet TK 1 T PO Q NIGHT 3/27/20   Provider, Historical   aspirin delayed-release 81 mg tablet Take  by mouth daily. Provider, Historical   aspirin 81 mg chewable tablet Take 81 mg by mouth daily. Provider, Historical     NKDA    Social History     Tobacco Use    Smoking status: Never Smoker    Smokeless tobacco: Never Used   Substance Use Topics    Alcohol use: Never     Frequency: Never    Drug use: Never     No family history on file. Subjective:      Addis Aiken is a 72 y.o. RHWM with history of arthritis, factor V leiden, hyperlipidemia and left vertebral artery stenosis who is here for further evaluation after a recent TIA and is here for follow up.     Per patient condition started 3/26/2020. Patient had abrupt onset of dizziness, difficulty walking and wobbliness, difficulty speaking and tingling in the left side of his face. Patient was seen in the ER. In ER blood pressure was stable. Head CT without contrast did not reveal any acute process. Head and neck CTA revealed no ICA stenosis. Moderate to severe stenosis of the proximal left vertebral artery. Right vertebral artery is dominant. Left vertebral artery terminates in PICA. Mild mid basilar stenosis. Diminutive basilar artery. Fetal circulations with the P1 and P2 segments on the right and left. Multilevel foraminal stenosis of the cervical spine. Laboratory work-up revealed unremarkable CBC, CMP, troponin with elevated LDL at 129.4. Echocardiogram done revealed EF of 55 to 60%. Bubble study did not reveal any PFO. Brain MRI without contrast did not reveal any acute stroke. Mild white matter disease. Per patient condition improved the next day. Patient was seen by Dr. Zhang Fong (neurology) and only found some gait instability but no other focal findings. Patient admits to not taking his Crestor daily and only takes aspirin every other day. Patient was discharge 3/27/2020 on Crestor 20 mg daily and 81 mg of aspirin daily. Since then, patient reports no recurrence of his symptoms. He reports compliance with Crestor 20 mg daily and aspirin 81 mg every other day. He does mention previous episode of left-sided facial numbness and that was when they found that he had a left vertebral artery stenosis. No new complaints. Since the last visit on 4/21/2020, patient reports no recurrence of the dizziness, difficulty walking, wobbliness difficulty speaking and tingling in the left side of his face. He did start Plavix 75 mg daily and has discontinued taking aspirin. Denies any side effects.   He reports having had a repeat lipid panel done and was told that the levels were at goal.  He continues to take Crestor 20 mg daily. Denies any side effects. He is currently driving without any issues. No new complaints. Outside reports reviewed: none    Review of Systems:    A comprehensive review of systems was performed:   Constitutional: positive for none  Eyes: positive for none  Ears, nose, mouth, throat, and face: positive for none  Respiratory: positive for none  Cardiovascular: positive for none  Gastrointestinal: positive for none  Genitourinary: positive for none  Integument/breast: positive for none  Hematologic/lymphatic: positive for none  Musculoskeletal: positive for none  Neurological: positive for none  Behavioral/Psych: positive for none  Endocrine: positive for none  Allergic/Immunologic: positive for none      Objective:     Visit Vitals  /82   Pulse 68   Temp 97.3 °F (36.3 °C)   Resp 20   Ht 5' 8\" (1.727 m)   SpO2 98%   BMI 32.84 kg/m²       PHYSICAL EXAM:    NEUROLOGICAL EXAM:    Appearance: The patient is well developed, well nourished, provides a coherent history and is in no acute distress. Mental Status: Oriented to time, place and person. Fluent, no aphasia or dysarthria. Mood and affect appropriate. Cranial Nerves:   II - XII were intact. Motor:  5/5 strength in upper and lower proximal and distal muscles. Normal bulk and tone. No fasciculations. No pronator drift. Reflexes:   Deep tendon reflexes were symmetrical.    Sensory:   Normal to cold. Gait:  Normal gait. No Romberg. Slight problems tandem walking. Tremor:   No tremor noted. Cerebellar:  Intact FTN/DUSTIN/HTS. Assessment:   TIA  VBI  Left vertebral artery narrowing  Basilar artery stenosis  Mixed hyperlipidemia    Plan:   Neurological examination is unchanged. Mild issues with tandem walking. No residual deficits. Event consistent with a TIA involving the posterior circulation. Patient was already on aspirin when the TIA occurred.   Given the issue of vertebral artery narrowing and diminutive basilar artery, it is prudent for patient to be on antiplatelet therapy. Continue Plavix 75 mg daily for stroke prophylaxis. Future prescriptions can be done by PCP. Patient is advised to call 911 if new deficits occur. No driving restrictions. 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 Antiplatelet therapy as above. Patient was advised to hydrate adequately on a daily basis and maintain adequate blood pressures. Head and neck CTA revealed left vertebral artery significant stenosis. This finding would require antiplatelet therapy as ordered above. If patient continues to be symptomatic then he may benefit from referral to neuro interventional specialist.    Diminutive basal artery but still having adequate flow. Antiplatelet therapy as above. Monitor for now. LDL in the hospital was highly elevated. Given TIA it should be less than 70. Per patient recent labs by PCP revealed at goal LDL. Continue Crestor 20 mg daily. Advised strict compliance with dietary modifications and medications for hyperlipidemia. This note was created using voice recognition software. Despite editing, there may be syntax errors.

## 2020-07-21 NOTE — PATIENT INSTRUCTIONS
Transient Ischemic Attack: Care Instructions  Your Care Instructions     A transient ischemic attack (TIA) is when blood flow to a part of your brain is blocked for a short time. A TIA is like a stroke but usually lasts only a few minutes. A TIA does not cause lasting brain damage. Any vision problems, slurred speech, or other symptoms usually go away in 10 to 20 minutes. But they may last for up to 24 hours. TIAs are often warning signs of a stroke. Some people who have a TIA may have a stroke in the future. A stroke can cause symptoms like those of a TIA. But a stroke causes lasting damage to your brain. You can take steps to help prevent a stroke. One thing you can do is get early treatment. If you have other new symptoms, or if your symptoms do not get better, go back to the emergency room or call your doctor right away. Getting treatment right away may prevent long-term brain damage caused by a stroke. The doctor has checked you carefully, but problems can develop later. If you notice any problems or new symptoms, get medical treatment right away. Follow-up care is a key part of your treatment and safety. Be sure to make and go to all appointments, and call your doctor if you are having problems. It's also a good idea to know your test results and keep a list of the medicines you take. How can you care for yourself at home? Medicines  · Be safe with medicines. Take your medicines exactly as prescribed. Call your doctor if you think you are having a problem with your medicine. · If you take a blood thinner, such as aspirin, be sure you get instructions about how to take your medicine safely. Blood thinners can cause serious bleeding problems. · Call your doctor if you are not able to take your medicines for any reason. · Do not take any over-the-counter medicines or herbal products without talking to your doctor first.  · If you take birth control pills or hormone therapy, talk to your doctor.  Ask if these treatments are right for you. Lifestyle changes  · Do not smoke. If you need help quitting, talk to your doctor about stop-smoking programs and medicines. · Be active. If your doctor recommends it, get more exercise. Walking is a good choice. Bit by bit, increase the amount you walk every day. Try for at least 30 minutes on most days of the week. You also may want to swim, bike, or do other activities. · Eat heart-healthy foods. These include fruits, vegetables, high-fiber foods, lean meats, beans, peas, nuts, seeds, and soy products, and foods that are low in sodium, saturated fat, and trans fat. · Stay at a healthy weight. Lose weight if you need to. · Limit alcohol to 2 drinks a day for men and 1 drink a day for women. Staying healthy  · Manage other health problems such as diabetes, high blood pressure, and high cholesterol. · Get the flu vaccine every year. When should you call for help? [id number] anytime you think you may need emergency care. For example, call if:  · You have new or worse symptoms of a stroke. These may include:  ? Sudden numbness, tingling, weakness, or loss of movement in your face, arm, or leg, especially on only one side of your body. ? Sudden vision changes. ? Sudden trouble speaking. ? Sudden confusion or trouble understanding simple statements. ? Sudden problems with walking or balance. ? A sudden, severe headache that is different from past headaches. Call 911 even if these symptoms go away in a few minutes. · You feel like you are having another TIA. Watch closely for changes in your health, and be sure to contact your doctor if you have any problems. Where can you learn more? Go to http://wes-patel.info/  Enter I231 in the search box to learn more about \"Transient Ischemic Attack: Care Instructions. \"  Current as of: March 4, 2020               Content Version: 12.5  © 6927-6328 [employer].    Care instructions adapted under license by [employer] (which disclaims liability or warranty for this information). If you have questions about a medical condition or this instruction, always ask your healthcare professional. [address] any warranty or liability for your use of this information.

## 2020-07-21 NOTE — LETTER
7/21/20 Patient: Geoffrey Lopez YOB: 1950 Date of Visit: 7/21/2020 Karyn Horner MD 
[facility] [address] [facility] [address] [id number] VIA Facsimile: [phone] Dear Karyn Horner MD, Thank you for referring Mr. Malorie Bearden to [facility] for evaluation. My notes for this consultation are attached. If you have questions, please do not hesitate to call me. I look forward to following your patient along with you. Sincerely, Adilene Braga MD

## 2020-07-21 NOTE — PROGRESS NOTES
Seems to be doing fine since his last visit   After being at work for about 1 hour he has to take something for his head it is hurting because of wearing his mask after he is able to get in his truck with air conditioner by the time he gets home the headache goes away

## 2020-08-30 NOTE — PROGRESS NOTES
Hospital Follow up TIA-     Has been doing okay since discharge, not having any of the issues that made him go to the hospital, nothing new has been an issue either no

## 2022-01-27 ENCOUNTER — HOSPITAL ENCOUNTER (EMERGENCY)
Age: 72
Discharge: HOME | End: 2022-01-27
Attending: STUDENT IN AN ORGANIZED HEALTH CARE EDUCATION/TRAINING PROGRAM
Payer: MEDICARE

## 2022-01-27 VITALS
TEMPERATURE: 97.1 F | SYSTOLIC BLOOD PRESSURE: 203 MMHG | DIASTOLIC BLOOD PRESSURE: 106 MMHG | OXYGEN SATURATION: 98 % | RESPIRATION RATE: 17 BRPM | HEART RATE: 96 BPM

## 2022-01-27 DIAGNOSIS — R33.8 ACUTE URINARY RETENTION: Primary | ICD-10-CM

## 2022-01-27 PROCEDURE — 93005 ELECTROCARDIOGRAM TRACING: CPT

## 2022-01-27 PROCEDURE — 99282 EMERGENCY DEPT VISIT SF MDM: CPT

## 2022-01-27 PROCEDURE — 51702 INSERT TEMP BLADDER CATH: CPT

## 2022-01-27 RX ORDER — LIDOCAINE HYDROCHLORIDE 20 MG/ML
JELLY TOPICAL
Status: DISCONTINUED | OUTPATIENT
Start: 2022-01-27 | End: 2022-01-27 | Stop reason: HOSPADM

## 2022-01-28 NOTE — ED PROVIDER NOTES
Patient is a 36-year-old  male presenting to the ED with acute suprapubic pain and inability to urinate status post cystoscopy today. Patient will urinate after procedure but plus the ability to urinate. The history is provided by the patient. Urinary Retention   This is a new problem. The current episode started 6 to 12 hours ago. The problem occurs constantly. The problem has been rapidly worsening. Associated with: Recent cystoscopy. The pain is located in the suprapubic region. The quality of the pain is aching, dull and pressure-like. The pain is at a severity of 8/10. The pain is moderate. Associated symptoms include dysuria. Pertinent negatives include no diarrhea and no nausea. Nothing worsens the pain. The pain is relieved by nothing. His past medical history is significant for kidney stones. Cystoscopy       Past Medical History:   Diagnosis Date    Arthritis     Factor V Leiden ([facility] [address]     Hyperlipidemia     Kidney disease     stone    Kidney stones     TIA (transient ischemic attack)        Past Surgical History:   Procedure Laterality Date    COLONOSCOPY N/A 12/24/2018    COLONOSCOPY performed by Lynn Griffith MD at [facility] HX HERNIA REPAIR Right     inguinal    HX HERNIA REPAIR Right          No family history on file.     Social History     Socioeconomic History    Marital status:      Spouse name: Not on file    Number of children: Not on file    Years of education: Not on file    Highest education level: Not on file   Occupational History    Not on file   Tobacco Use    Smoking status: Never Smoker    Smokeless tobacco: Never Used   Substance and Sexual Activity    Alcohol use: Never    Drug use: Never    Sexual activity: Not on file   Other Topics Concern    Not on file   Social History Narrative    ** Merged History Encounter **          Social Determinants of Health     Financial Resource Strain:     Difficulty of Paying Living Expenses: Not on file Food Insecurity:     Worried About Running Out of Food in the Last Year: Not on file    Yesenia of Food in the Last Year: Not on file   Transportation Needs:     Lack of Transportation (Medical): Not on file    Lack of Transportation (Non-Medical): Not on file   Physical Activity:     Days of Exercise per Week: Not on file    Minutes of Exercise per Session: Not on file   Stress:     Feeling of Stress : Not on file   Social Connections:     Frequency of Communication with Friends and Family: Not on file    Frequency of Social Gatherings with Friends and Family: Not on file    Attends Latter-day Services: Not on file    Active Member of [address] [place] [address] [employer] or Organizations: Not on file    Attends Club or Organization Meetings: Not on file    Marital Status: Not on file   Intimate Partner Violence:     Fear of Current or Ex-Partner: Not on file    Emotionally Abused: Not on file    Physically Abused: Not on file    Sexually Abused: Not on file   Housing Stability:     Unable to Pay for Housing in the Last Year: Not on file    Number of [place] in the Last Year: Not on file    Unstable Housing in the Last Year: Not on file         ALLERGIES: Patient has no known allergies. Review of Systems   Constitutional: Positive for diaphoresis. HENT: Negative. Eyes: Negative. Respiratory: Negative. Cardiovascular: Negative. Gastrointestinal: Positive for abdominal pain. Negative for diarrhea and nausea. Endocrine: Negative. Genitourinary: Positive for difficulty urinating and dysuria. Musculoskeletal: Negative. Skin: Negative. Allergic/Immunologic: Negative. Neurological: Negative. Hematological: Negative. Psychiatric/Behavioral: Negative. Vitals:    01/27/22 1856   BP: (!) 203/106   Pulse: 96   Resp: 17   Temp: 97.1 °F (36.2 °C)   SpO2: 98%            Physical Exam  Vitals and nursing note reviewed. Constitutional:       General: He is in acute distress. Appearance: Normal appearance. He is ill-appearing. HENT:      Head: Normocephalic and atraumatic. Right Ear: External ear normal.      Left Ear: External ear normal.      Nose: Nose normal.   Eyes:      Extraocular Movements: Extraocular movements intact. Conjunctiva/sclera: Conjunctivae normal.   Cardiovascular:      Rate and Rhythm: Normal rate. Pulses: Normal pulses. Radial pulses are 2+ on the right side and 2+ on the left side. Heart sounds: Normal heart sounds. Pulmonary:      Effort: Pulmonary effort is normal.      Breath sounds: Normal breath sounds. Chest:      Chest wall: No deformity or tenderness. Abdominal:      General: Abdomen is flat. There is no distension. Tenderness: There is abdominal tenderness in the suprapubic area. Hernia: No hernia is present. Musculoskeletal:         General: No deformity or signs of injury. Normal range of motion. Cervical back: Normal range of motion and neck supple. No tenderness. Skin:     General: Skin is warm and dry. Capillary Refill: Capillary refill takes less than 2 seconds. Neurological:      General: No focal deficit present. Mental Status: He is alert and oriented to person, place, and time. Psychiatric:         Attention and Perception: Attention normal.         Mood and Affect: Mood normal.         Behavior: Behavior normal.          [facility]  ED Course as of 01/28/22 0233   Thu Jan 27, 2022 1948 EKG interpretation:   Rhythm: normal sinus rhythm; and regular . Rate (approx.): 92; Axis: normal; Intervals: normal ; ST/T wave: non-specific changes; EKG documented and interpreted by Osito Garcia.  Jose Walter MD, Emergency Medicine.   [AL]      ED Course User Index  [AL] Taina Brown MD     Differential diagnosis: Urinary urinary retension, postop problem    MDM: Is a 80-year-old male presenting to the ED with suprapubic pain and inability at this post cystoscopy found to have acute urinary retention requiring Sosa catheter placement with resolution of symptoms appropriate for outpatient follow-up and discharge with Sosa catheter. Patient vitals are stable, patient febrile. Key discharge instructions and summary of care: You presented to ED with acute urinary retention after cystoscopy. Sosa catheter was placed and given 500 mL of urine came out consistent with acute urine retention. Call your urologist for follow-up in 3 to 5 days for Sosa catheter assessment and possible removal.      The patient has been re-evaluated and feeling better. Patient is stable for discharge. All available radiology and laboratory results have been reviewed with patient and/or available family. Patient and/or family verbally conveyed their understanding and agreement of the patient's signs, symptoms, diagnosis, treatment and prognosis and additionally agree to follow-up as recommended in the discharge instructions or to return to the Emergency Department should their condition change or worsen prior to their follow-up appointment. All questions have been answered and patient and/or available family express understanding. IMPRESSION:  1. Acute urinary retention        DISPOSITION: Discharged    Shoaib Reynolds MD        Procedures

## 2022-01-28 NOTE — DISCHARGE INSTRUCTIONS
You presented to ED with acute urinary retention after cystoscopy. Sosa catheter was placed and given 500 mL of urine came out consistent with acute urine retention.   Call your urologist for follow-up in 3 to 5 days for Sosa catheter assessment and possible removal.

## 2022-01-28 NOTE — ED TRIAGE NOTES
Pt arrives to the ER for complaints lower abdominal pain that started 1900 tonight. Pt had a cystoscopy at 0620 today and reports that he was able to void normally after procedure. Pt called his urologist and told him about the pain and referred him to come to the ER. [facility] in March 2020.

## 2022-01-30 LAB
ATRIAL RATE: 92 BPM
CALCULATED P AXIS, ECG09: 64 DEGREES
CALCULATED R AXIS, ECG10: -14 DEGREES
CALCULATED T AXIS, ECG11: 44 DEGREES
DIAGNOSIS, 93000: NORMAL
P-R INTERVAL, ECG05: 176 MS
Q-T INTERVAL, ECG07: 334 MS
QRS DURATION, ECG06: 88 MS
QTC CALCULATION (BEZET), ECG08: 413 MS
VENTRICULAR RATE, ECG03: 92 BPM

## 2022-02-09 ENCOUNTER — HOSPITAL ENCOUNTER (EMERGENCY)
Age: 72
Discharge: HOME | End: 2022-02-09
Attending: EMERGENCY MEDICINE
Payer: MEDICARE

## 2022-02-09 VITALS
WEIGHT: 216 LBS | BODY MASS INDEX: 32.84 KG/M2 | RESPIRATION RATE: 17 BRPM | DIASTOLIC BLOOD PRESSURE: 103 MMHG | TEMPERATURE: 97.5 F | SYSTOLIC BLOOD PRESSURE: 149 MMHG | HEART RATE: 107 BPM | OXYGEN SATURATION: 96 %

## 2022-02-09 DIAGNOSIS — R33.9 URINARY RETENTION: Primary | ICD-10-CM

## 2022-02-09 LAB
APPEARANCE UR: CLEAR
BACTERIA URNS QL MICRO: ABNORMAL /HPF
BILIRUB UR QL: NEGATIVE
COLOR UR: ABNORMAL
EPITH CASTS URNS QL MICRO: ABNORMAL /LPF
GLUCOSE UR STRIP.AUTO-MCNC: NEGATIVE MG/DL
HGB UR QL STRIP: NEGATIVE
HYALINE CASTS URNS QL MICRO: ABNORMAL /LPF (ref 0–5)
KETONES UR QL STRIP.AUTO: NEGATIVE MG/DL
LEUKOCYTE ESTERASE UR QL STRIP.AUTO: ABNORMAL
NITRITE UR QL STRIP.AUTO: NEGATIVE
PH UR STRIP: 6 [PH] (ref 5–8)
PROT UR STRIP-MCNC: NEGATIVE MG/DL
RBC #/AREA URNS HPF: ABNORMAL /HPF (ref 0–5)
SP GR UR REFRACTOMETRY: 1.01 (ref 1–1.03)
UR CULT HOLD, URHOLD: NORMAL
UROBILINOGEN UR QL STRIP.AUTO: 0.2 EU/DL (ref 0.2–1)
WBC URNS QL MICRO: ABNORMAL /HPF (ref 0–4)

## 2022-02-09 PROCEDURE — 99282 EMERGENCY DEPT VISIT SF MDM: CPT

## 2022-02-09 PROCEDURE — 51702 INSERT TEMP BLADDER CATH: CPT

## 2022-02-09 PROCEDURE — 81001 URINALYSIS AUTO W/SCOPE: CPT

## 2022-02-09 PROCEDURE — 77030005513 HC CATH URETH FOL11 MDII -B

## 2022-02-09 PROCEDURE — 77030012865 HC BG URIN LEG MDII -A

## 2022-02-09 PROCEDURE — 36415 COLL VENOUS BLD VENIPUNCTURE: CPT

## 2022-02-09 RX ORDER — CEPHALEXIN 250 MG/1
250 CAPSULE ORAL 2 TIMES DAILY
Qty: 14 CAPSULE | Refills: 0 | Status: SHIPPED | OUTPATIENT
Start: 2022-02-09 | End: 2022-02-16

## 2022-02-09 NOTE — ED PROVIDER NOTES
History of kidney stones, hyperlipidemia, factor V Leiden deficiency, arthritis, TIA, BPH. He presents with complaints of urinary retention. He awoke approximately 2.5 hours ago with the urge to void but has been unable. His bladder feels full. He last voided approximately 6 hours ago. The discomfort is moderate. He recently had similar symptoms approximately 2 weeks ago. He had a catheter placed for 4 days at that time. He states that he has been able to void (since the catheter was removed) without much difficulty up until this morning. No fever or other symptoms. Good p.o. intake. Past Medical History:   Diagnosis Date    Arthritis     Factor V Leiden ([facility] [address]     Hyperlipidemia     Kidney disease     stone    Kidney stones     TIA (transient ischemic attack)        Past Surgical History:   Procedure Laterality Date    COLONOSCOPY N/A 12/24/2018    COLONOSCOPY performed by Kee Hopson MD at [address] [place] [address] HX HERNIA REPAIR Right     inguinal    HX HERNIA REPAIR Right          No family history on file. Social History     Socioeconomic History    Marital status:      Spouse name: Not on file    Number of children: Not on file    Years of education: Not on file    Highest education level: Not on file   Occupational History    Not on file   Tobacco Use    Smoking status: Never Smoker    Smokeless tobacco: Never Used   Substance and Sexual Activity    Alcohol use: Never    Drug use: Never    Sexual activity: Not on file   Other Topics Concern    Not on file   Social History Narrative    ** Merged History Encounter **          Social Determinants of Health     Financial Resource Strain:     Difficulty of Paying Living Expenses: Not on file   Food Insecurity:     Worried About Running Out of Food in the Last Year: Not on file    Yesenia of Food in the Last Year: Not on file   Transportation Needs:     Lack of Transportation (Medical):  Not on file    Lack of Transportation (Non-Medical): Not on file   Physical Activity:     Days of Exercise per Week: Not on file    Minutes of Exercise per Session: Not on file   Stress:     Feeling of Stress : Not on file   Social Connections:     Frequency of Communication with Friends and Family: Not on file    Frequency of Social Gatherings with Friends and Family: Not on file    Attends [employer] Services: Not on file    Active Member of [address] [place] [address] or Organizations: Not on file    Attends Club or Organization Meetings: Not on file    Marital Status: Not on file   Intimate Partner Violence:     Fear of Current or Ex-Partner: Not on file    Emotionally Abused: Not on file    Physically Abused: Not on file    Sexually Abused: Not on file   Housing Stability:     Unable to Pay for Housing in the Last Year: Not on file    Number of [place] in the Last Year: Not on file    Unstable Housing in the Last Year: Not on file         ALLERGIES: Patient has no known allergies. Review of Systems   All other systems reviewed and are negative. Vitals:    02/09/22 0530   BP: (!) 149/103   Pulse: (!) 107   Resp: 17   Temp: 97.5 °F (36.4 °C)   SpO2: 96%   Weight: 98 kg (216 lb)            Physical Exam  Vitals and nursing note reviewed. Constitutional:       Appearance: He is well-developed. Comments: Appears mildly uncomfortable. HENT:      Head: Normocephalic and atraumatic. Eyes:      Conjunctiva/sclera: Conjunctivae normal.   Neck:      Trachea: No tracheal deviation. Cardiovascular:      Rate and Rhythm: Normal rate and regular rhythm. Heart sounds: Normal heart sounds. No murmur heard. No friction rub. No gallop. Pulmonary:      Effort: Pulmonary effort is normal.      Breath sounds: Normal breath sounds. Abdominal:      Palpations: Abdomen is soft. Tenderness: There is no abdominal tenderness. Musculoskeletal:         General: No deformity. Cervical back: Neck supple.    Skin: General: Skin is warm and dry. Neurological:      Mental Status: He is alert. Comments: oriented          MDM       Procedures    Progress Note:  Results, treatment, and follow up plan have been discussed with patient. Questions were answered. He feels much better since the catheter was placed. Alize Coleman MD  6:20 AM    Assessment/plan: Urinary retention due to BPH. Reassuring appearance/exam with stable vital signs. UA suggest UTI. Home with Keflex and Sosa/leg bag. Urology follow-up. Return precautions discussed.   Alize Coleman MD  6:20 AM

## 2022-02-09 NOTE — ED TRIAGE NOTES
Pt. States woke up around 0300 and could not pass his urine. Pt. Has hx of stones and having to have bee placed on 1/30. States has prostate issues as well.

## 2023-01-31 ENCOUNTER — HOSPITAL ENCOUNTER (EMERGENCY)
Age: 73
Discharge: HOME | End: 2023-01-31
Attending: EMERGENCY MEDICINE
Payer: MEDICARE

## 2023-01-31 VITALS
DIASTOLIC BLOOD PRESSURE: 83 MMHG | WEIGHT: 185 LBS | HEIGHT: 68 IN | BODY MASS INDEX: 28.04 KG/M2 | HEART RATE: 73 BPM | TEMPERATURE: 97.9 F | SYSTOLIC BLOOD PRESSURE: 132 MMHG | RESPIRATION RATE: 17 BRPM | OXYGEN SATURATION: 96 %

## 2023-01-31 DIAGNOSIS — R33.9 URINARY RETENTION: Primary | ICD-10-CM

## 2023-01-31 LAB
APPEARANCE UR: CLEAR
BACTERIA URNS QL MICRO: NEGATIVE /HPF
BILIRUB UR QL: NEGATIVE
COLOR UR: ABNORMAL
EPITH CASTS URNS QL MICRO: ABNORMAL /LPF
GLUCOSE UR STRIP.AUTO-MCNC: NEGATIVE MG/DL
HGB UR QL STRIP: ABNORMAL
HYALINE CASTS URNS QL MICRO: ABNORMAL /LPF (ref 0–2)
KETONES UR QL STRIP.AUTO: NEGATIVE MG/DL
LEUKOCYTE ESTERASE UR QL STRIP.AUTO: NEGATIVE
NITRITE UR QL STRIP.AUTO: NEGATIVE
PH UR STRIP: 7.5 (ref 5–8)
PROT UR STRIP-MCNC: NEGATIVE MG/DL
RBC #/AREA URNS HPF: ABNORMAL /HPF (ref 0–5)
SP GR UR REFRACTOMETRY: 1.01 (ref 1–1.03)
UR CULT HOLD, URHOLD: NORMAL
UROBILINOGEN UR QL STRIP.AUTO: 0.2 EU/DL (ref 0.2–1)
WBC URNS QL MICRO: ABNORMAL /HPF (ref 0–4)

## 2023-01-31 PROCEDURE — 81001 URINALYSIS AUTO W/SCOPE: CPT

## 2023-01-31 PROCEDURE — 51702 INSERT TEMP BLADDER CATH: CPT

## 2023-01-31 PROCEDURE — 99284 EMERGENCY DEPT VISIT MOD MDM: CPT

## 2023-01-31 PROCEDURE — 51798 US URINE CAPACITY MEASURE: CPT

## 2023-01-31 NOTE — ED TRIAGE NOTES
Pt arrives with urinary retention that started this morning  States prostate hx and hx of having to have a bee placed

## 2023-01-31 NOTE — ED PROVIDER NOTES
HPI   Patient is a 71 y.o. M who presents today with complaints of urinary retention since this morning. He does have history of BPH, nephrolithiasis. States he has had urinary retention in the past and has required intermittent indwelling Sosa's. He does follow closely with urologist at [facility]. States last urinated last night woke up this morning and was unable to void. He denies any abdominal pain, flank pain, nausea, vomiting, headache, fevers. No testicular pain, scrotal swelling, diarrhea. ALLERGIES: Patient has no known allergies. Past Medical History:   Diagnosis Date    Arthritis     Factor V Leiden ([facility] [address]     Hyperlipidemia     Kidney disease     stone    Kidney stones     TIA (transient ischemic attack)      Past Surgical History:   Procedure Laterality Date    COLONOSCOPY N/A 12/24/2018    COL[facility] performed by Nai Rush MD at [facility] ENDOSCOPY    HX HERNIA REPAIR Right     inguinal    HX HERNIA REPAIR Right        Review of Systems   Constitutional:  Negative for fatigue and fever. HENT:  Negative for congestion. Respiratory:  Negative for cough, shortness of breath and wheezing. Cardiovascular:  Negative for chest pain. Gastrointestinal:  Negative for abdominal pain, constipation, diarrhea, nausea and vomiting. Genitourinary:  Positive for decreased urine volume and difficulty urinating. Negative for flank pain, penile pain, scrotal swelling and testicular pain. Musculoskeletal:  Negative for arthralgias and myalgias. Skin:  Negative for wound. Neurological:  Negative for dizziness, seizures, light-headedness and numbness. Psychiatric/Behavioral:  Negative for confusion. Vitals:    01/31/23 1116 01/31/23 1139 01/31/23 1141 01/31/23 1142   BP:       Pulse: 74 65 70 73   Resp: 17      Temp:       SpO2: (!) 87% 95% 95% 96%   Weight:       Height:                Physical Exam  Vitals and nursing note reviewed. Exam conducted with a chaperone present. Constitutional:       General: He is not in acute distress. Appearance: Normal appearance. He is not ill-appearing, toxic-appearing or diaphoretic. HENT:      Head: Normocephalic and atraumatic. Nose: Nose normal.      Mouth/Throat:      Mouth: Mucous membranes are moist.   Cardiovascular:      Rate and Rhythm: Normal rate and regular rhythm. Pulmonary:      Effort: Pulmonary effort is normal. No respiratory distress. Breath sounds: Normal breath sounds. No stridor. No wheezing, rhonchi or rales. Chest:      Chest wall: No tenderness. Abdominal:      General: Abdomen is flat. Bowel sounds are normal.      Palpations: Abdomen is soft. Tenderness: There is no abdominal tenderness. There is no right CVA tenderness or left CVA tenderness. Genitourinary:     Penis: Normal.       Testes: Normal.   Musculoskeletal:         General: Normal range of motion. Cervical back: Normal range of motion. Skin:     General: Skin is warm and dry. Neurological:      Mental Status: He is alert and oriented to person, place, and time. Mental status is at baseline. Psychiatric:         Mood and Affect: Mood normal.         Behavior: Behavior normal.         Thought Content:  Thought content normal.            LABORATORY RESULTS:  Recent Results (from the past 24 hour(s))   URINALYSIS W/ RFLX MICROSCOPIC    Collection Time: 01/31/23 10:23 AM   Result Value Ref Range    Color YELLOW/STRAW      Appearance CLEAR CLEAR      Specific gravity 1.009 1.003 - 1.030      pH (UA) 7.5 5.0 - 8.0      Protein Negative NEG mg/dL    Glucose Negative NEG mg/dL    Ketone Negative NEG mg/dL    Bilirubin Negative NEG      Blood SMALL (A) NEG      Urobilinogen 0.2 0.2 - 1.0 EU/dL    Nitrites Negative NEG      Leukocyte Esterase Negative NEG      WBC 0-4 0 - 4 /hpf    RBC 10-20 0 - 5 /hpf    Epithelial cells FEW FEW /lpf    Bacteria Negative NEG /hpf    Hyaline cast 0-2 0 - 2 /lpf   URINE CULTURE HOLD SAMPLE    Collection Time: 01/31/23 10:23 AM    Specimen: Urine   Result Value Ref Range    Urine culture hold        Urine on hold in Microbiology dept for 2 days. If unpreserved urine is submitted, it cannot be used for addtional testing after 24 hours, recollection will be required. IMAGING RESULTS:  No results found. MEDICATIONS GIVEN:  Medications - No data to display         MDM  Patient seen today with urinary retention. Indwelling Sosa was placed in good output. Patient has no abdominal tenderness palpation, no CVA tenderness. He does have history of nephrolithiasis and I did consider this as differential but patient has no pain. He has had urinary retention in the past and follows already with [facility] for this. Plan to discharge with indwelling Sosa and close follow-up with urology with strict return precautions. Nursing staff did note patient to be hypoxic in the mid 80s, placed patient on supplemental oxygen briefly. Patient was taken off of supplemental oxygen and satting in the mid 90s. Obtained ambulatory oxygen saturation and patient was 96%. Patient has no chest pain, shortness of breath, no cough, fever, no respiratory complaints. Discussed results and work-up with patient and answered all questions, the patient expresses understanding and agrees with the care plan and disposition. The patient was given an opportunity to ask questions and all concerns raised were addressed prior to discharge. Recommended patient follow-up with provider as listed below. Counseled patient on standard home and self-care measures. Specifically explained the emergent conditions that could arise and clearly instructed the patient to return to the emergency department for those and any other new, worsening, or concerning symptoms. Patient stable and ready for discharge. History, exam, medical decision making, and plan discussed with ED attending, Dr. Santos Yeager. IMPRESSION:  1.  Urinary retention DISPOSITION:  Discharge    PLAN:  Follow-up Information       Follow up With Specialties Details Why Contact Info    Lily Aguilar MD [facility] Medicine Schedule an appointment as soon as possible for a visit   [address] [id number]  [facility] Urology Schedule an appointment as soon as possible for a visit   [address] [facility] [address]          Discharge Medication List as of 1/31/2023 11:51 AM            Please note that this dictation was completed with [employer], the computer voice recognition software. Quite often unanticipated grammatical, syntax, homophones, and other interpretive errors are inadvertently transcribed by the computer software. Please disregard these errors. Please excuse any errors that have escaped final proofreading.

## 2023-01-31 NOTE — ED NOTES
I have reviewed discharge instructions with the patient. The patient verbalized understanding. Patient leaves ED ambulatory and in no acute distress.

## (undated) DEVICE — Device

## (undated) DEVICE — REM POLYHESIVE ADULT PATIENT RETURN ELECTRODE: Brand: VALLEYLAB

## (undated) DEVICE — SNARE ENDOSCP M L240CM W27MM SHTH DIA2.4MM CHN 2.8MM OVL

## (undated) DEVICE — NDL PRT INJ NSAF BLNT 18GX1.5 --

## (undated) DEVICE — BAG SPEC BIOHZRD 10 X 10 IN --

## (undated) DEVICE — KENDALL RADIOLUCENT FOAM MONITORING ELECTRODE -RECTANGULAR SHAPE: Brand: KENDALL

## (undated) DEVICE — SET ADMIN 16ML TBNG L100IN 2 Y INJ SITE IV PIGGY BK DISP

## (undated) DEVICE — CATH IV AUTOGRD BC BLU 22GA 25 -- INSYTE

## (undated) DEVICE — CANN NASAL O2 CAPNOGRAPHY AD -- FILTERLINE

## (undated) DEVICE — SOLIDIFIER MEDC 1200ML -- CONVERT TO 356117

## (undated) DEVICE — ADULT SPO2 SENSOR: Brand: NELLCOR

## (undated) DEVICE — SET GRAV CK VLV NEEDLESS ST 3 GANGED 4WAY STPCOCK HI FLO 10

## (undated) DEVICE — CONTAINER SPEC 20 ML LID NEUT BUFF FORMALIN 10 % POLYPR STS

## (undated) DEVICE — SYR 10ML LUER LOK 1/5ML GRAD --

## (undated) DEVICE — NEEDLE SCLERO 23GA L4MM CATH L240CM CNTRST SHTH DIA1.8MM

## (undated) DEVICE — POLYP TRAP: Brand: TRAPEASE®

## (undated) DEVICE — 1200 GUARD II KIT W/5MM TUBE W/O VAC TUBE: Brand: GUARDIAN

## (undated) DEVICE — KIT COLON W/ 1.1OZ LUB AND 2 END

## (undated) DEVICE — BAG BELONG PT PERS CLEAR HANDL

## (undated) DEVICE — 3M™ CUROS™ DISINFECTING CAP FOR NEEDLELESS CONNECTORS 270/CARTON 20 CARTONS/CASE CFF1-270: Brand: CUROS™